# Patient Record
Sex: FEMALE | Race: WHITE | Employment: UNEMPLOYED | URBAN - METROPOLITAN AREA
[De-identification: names, ages, dates, MRNs, and addresses within clinical notes are randomized per-mention and may not be internally consistent; named-entity substitution may affect disease eponyms.]

---

## 2020-02-02 ENCOUNTER — HOSPITAL ENCOUNTER (INPATIENT)
Age: 25
LOS: 8 days | Discharge: HOME | DRG: 753 | End: 2020-02-10
Attending: PSYCHIATRY & NEUROLOGY | Admitting: PSYCHIATRY & NEUROLOGY
Payer: MEDICAID

## 2020-02-02 PROBLEM — F31.9 BIPOLAR 1 DISORDER (HCC): Status: ACTIVE | Noted: 2020-02-02

## 2020-02-02 PROCEDURE — 1240000000 HC EMOTIONAL WELLNESS R&B

## 2020-02-02 RX ORDER — NICOTINE 21 MG/24HR
1 PATCH, TRANSDERMAL 24 HOURS TRANSDERMAL DAILY
Status: DISCONTINUED | OUTPATIENT
Start: 2020-02-03 | End: 2020-02-05

## 2020-02-02 RX ORDER — TRAZODONE HYDROCHLORIDE 50 MG/1
50 TABLET ORAL NIGHTLY PRN
Status: DISCONTINUED | OUTPATIENT
Start: 2020-02-02 | End: 2020-02-10 | Stop reason: HOSPADM

## 2020-02-02 ASSESSMENT — SLEEP AND FATIGUE QUESTIONNAIRES
AVERAGE NUMBER OF SLEEP HOURS: 4
DIFFICULTY FALLING ASLEEP: YES
DIFFICULTY STAYING ASLEEP: YES
SLEEP PATTERN: DIFFICULTY FALLING ASLEEP;RESTLESSNESS;DISTURBED/INTERRUPTED SLEEP
DO YOU HAVE DIFFICULTY SLEEPING: YES
DIFFICULTY ARISING: NO
RESTFUL SLEEP: NO
DO YOU USE A SLEEP AID: NO

## 2020-02-02 ASSESSMENT — LIFESTYLE VARIABLES: HISTORY_ALCOHOL_USE: NO

## 2020-02-02 ASSESSMENT — PAIN - FUNCTIONAL ASSESSMENT: PAIN_FUNCTIONAL_ASSESSMENT: 0-10

## 2020-02-03 LAB
ALBUMIN SERPL-MCNC: 4.7 G/DL (ref 3.5–5.2)
ALBUMIN/GLOBULIN RATIO: NORMAL (ref 1–2.5)
ALP BLD-CCNC: 66 U/L (ref 35–104)
ALT SERPL-CCNC: 14 U/L (ref 5–33)
ANION GAP SERPL CALCULATED.3IONS-SCNC: 10 MMOL/L (ref 9–17)
AST SERPL-CCNC: 20 U/L
BILIRUB SERPL-MCNC: 0.42 MG/DL (ref 0.3–1.2)
BUN BLDV-MCNC: 15 MG/DL (ref 6–20)
BUN/CREAT BLD: NORMAL (ref 9–20)
CALCIUM SERPL-MCNC: 9.7 MG/DL (ref 8.6–10.4)
CHLORIDE BLD-SCNC: 99 MMOL/L (ref 98–107)
CO2: 28 MMOL/L (ref 20–31)
CREAT SERPL-MCNC: 0.84 MG/DL (ref 0.5–0.9)
GFR AFRICAN AMERICAN: >60 ML/MIN
GFR NON-AFRICAN AMERICAN: >60 ML/MIN
GFR SERPL CREATININE-BSD FRML MDRD: NORMAL ML/MIN/{1.73_M2}
GFR SERPL CREATININE-BSD FRML MDRD: NORMAL ML/MIN/{1.73_M2}
GLUCOSE BLD-MCNC: 91 MG/DL (ref 70–99)
POTASSIUM SERPL-SCNC: 4.8 MMOL/L (ref 3.7–5.3)
SODIUM BLD-SCNC: 137 MMOL/L (ref 135–144)
TOTAL PROTEIN: 7.2 G/DL (ref 6.4–8.3)
TSH SERPL DL<=0.05 MIU/L-ACNC: 0.96 MIU/L (ref 0.3–5)

## 2020-02-03 PROCEDURE — 1240000000 HC EMOTIONAL WELLNESS R&B

## 2020-02-03 PROCEDURE — 6370000000 HC RX 637 (ALT 250 FOR IP): Performed by: PSYCHIATRY & NEUROLOGY

## 2020-02-03 PROCEDURE — 36415 COLL VENOUS BLD VENIPUNCTURE: CPT

## 2020-02-03 PROCEDURE — 80053 COMPREHEN METABOLIC PANEL: CPT

## 2020-02-03 PROCEDURE — 99223 1ST HOSP IP/OBS HIGH 75: CPT | Performed by: PSYCHIATRY & NEUROLOGY

## 2020-02-03 PROCEDURE — 84443 ASSAY THYROID STIM HORMONE: CPT

## 2020-02-03 RX ORDER — ACETAMINOPHEN 325 MG/1
650 TABLET ORAL EVERY 4 HOURS PRN
Status: DISCONTINUED | OUTPATIENT
Start: 2020-02-03 | End: 2020-02-10 | Stop reason: HOSPADM

## 2020-02-03 RX ORDER — LORAZEPAM 2 MG/ML
1 INJECTION INTRAMUSCULAR EVERY 4 HOURS PRN
Status: DISCONTINUED | OUTPATIENT
Start: 2020-02-03 | End: 2020-02-10 | Stop reason: HOSPADM

## 2020-02-03 RX ORDER — TRAZODONE HYDROCHLORIDE 50 MG/1
50 TABLET ORAL NIGHTLY PRN
Status: DISCONTINUED | OUTPATIENT
Start: 2020-02-03 | End: 2020-02-03

## 2020-02-03 RX ORDER — HALOPERIDOL 10 MG/1
5 TABLET ORAL EVERY 4 HOURS PRN
Status: DISCONTINUED | OUTPATIENT
Start: 2020-02-03 | End: 2020-02-10 | Stop reason: HOSPADM

## 2020-02-03 RX ORDER — HALOPERIDOL 5 MG/ML
5 INJECTION INTRAMUSCULAR EVERY 4 HOURS PRN
Status: DISCONTINUED | OUTPATIENT
Start: 2020-02-03 | End: 2020-02-10 | Stop reason: HOSPADM

## 2020-02-03 RX ORDER — LORAZEPAM 1 MG/1
1 TABLET ORAL EVERY 4 HOURS PRN
Status: DISCONTINUED | OUTPATIENT
Start: 2020-02-03 | End: 2020-02-10 | Stop reason: HOSPADM

## 2020-02-03 RX ADMIN — LURASIDONE HYDROCHLORIDE 40 MG: 40 TABLET, FILM COATED ORAL at 21:02

## 2020-02-03 ASSESSMENT — SLEEP AND FATIGUE QUESTIONNAIRES
DIFFICULTY STAYING ASLEEP: YES
DIFFICULTY ARISING: NO
DO YOU HAVE DIFFICULTY SLEEPING: YES
DIFFICULTY FALLING ASLEEP: YES
SLEEP PATTERN: DIFFICULTY FALLING ASLEEP;RESTLESSNESS;DISTURBED/INTERRUPTED SLEEP
AVERAGE NUMBER OF SLEEP HOURS: 4
DO YOU USE A SLEEP AID: NO
RESTFUL SLEEP: NO

## 2020-02-03 ASSESSMENT — LIFESTYLE VARIABLES: HISTORY_ALCOHOL_USE: NO
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
## 2020-02-03 NOTE — H&P
Department of Psychiatry  History and Physical - Adult     CHIEF COMPLAINT:  Bipolar disorder    History obtained from:  patient, electronic medical record    Patient was seen after discussing with the treatment team and reviewing the chart\    CIRCUMSTANCES OF ADMISSION: Per admission note  Patient reports recent break up with her oldest daughter's father. After break up, she reports losing her full time job. Unable to pay her rent for 2 months and lost apartment. Lost insurance and couldn't afford meds. Off since Oct 2019. Moved back in with mother in [facility]. Recently started new job in [place] area and has been commuting there, sleeping in her car and showering at her fitness facility. Denies AOD abuse. Homeless and has been \"stalking\" a debbie she doesn't know and his family; recent protection order against her. Posted on [employer] \" I just want to leave it all. ..forever\" Manic, hyper-verbal, tangential speech but cooperative    HISTORY OF PRESENT ILLNESS:      The patient is a 30 y.o. female with significant past history of bipolar disorder who was admitted with suicidal ideation    Patient reports that she was admitted to the hospital because she just mentioned suicidal ideation and that was picked up by her father who lives in [place] which led to her admission. She is not happy about being admitted to the hospital.  However she does recognize that she is not feeling normal and this is related to her stopping her medications which is Latuda 40 mg nightly. In terms of stressors she describes that she broke up with her boyfriend recently and also lost her job as a . She has 2 children aged 3 and 2. The older child is with her mother and the younger child is with her biological father. The patient gets no child support. The patient also has to child protective orders in place against her.   She is currently homeless though she wants to get a place in a low income housing project at input(s): BILITOT, ALKPHOS, AST, ALT in the last 72 hours. No results found for: Harles Dimas, LABBENZ, CANNAB, COCAINESCRN, LABMETH, [address], PHENCYCLIDINESCREENURINE, PPXUR, ETOH  No results found for: TSH, FREET4  No results found for: LITHIUM  No results found for: VALPROATE, CBMZ  No results found for: LITHIUM, VALPROATE    FURTHER LABS ORDERED :      Radiology   No results found. EKG: n/a    TREATMENT PLAN:    Risk Management:  routine:  no special precautions necessary    Collateral Information:  Will obtain collateral information from the family or friends. Will obtain medical records as appropriate from out patient providers  Will consult the hospitalist for a physical exam to rule out any co-morbid physical condition. Home medication Reconciled       New Medications started during this admission :    Add Latuda 40 mg nightly  Prn Haldol 5mg and Vistaril 50mg q6hr for extreme agitation. Trazodone as ordered for insomnia  Vistaril as ordered for anxiety  Discussed with the patient risk, benefit, alternative and common side effects for the  proposed medication treatment. Patient is consenting to the treatment. Psychotherapy:   Encourage participation in milieu and group therapy  Individual therapy as needed        Behavioral Services  Medicare Certification      Admission Day 1  I certify that this patient's inpatient psychiatric hospital admission is medically necessary for:     (1) treatment which could reasonably be expected to improve this patient's condition, or     (2) diagnostic study or its equivalent. Electronically signed by Ulisses Maldonado MD on 2/3/2020 at 1:58 PM  Please note that this chart was generated using voice recognition Dragon dictation software. Although every effort was made to ensure the accuracy of this automated transcription, some errors in transcription may have occurred.

## 2020-02-03 NOTE — FLOWSHEET NOTE
*Patient participated in the Spirituality Group       02/03/20 4925   Encounter Summary   Services provided to: Patient   Referral/Consult From: Rounding   Continue Visiting   (2/3/20)   Complexity of Encounter Low   Length of Encounter 30 minutes   Spiritual/Tenriism   Type Spiritual support   Assessment Calm; Approachable   Intervention Active listening   Outcome Receptive
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
## 2020-02-03 NOTE — H&P
of the speech. PROVISIONAL DIAGNOSES:      Active Problems:    Bipolar 1 disorder (HCC)  Resolved Problems:    * No resolved hospital problems.  *      ZACK QUINTANILLA - CNP on 2/3/2020 at 2:31 PM

## 2020-02-04 LAB
HCT VFR BLD CALC: 38.4 % (ref 36–46)
HEMOGLOBIN: 13.3 G/DL (ref 12–16)
MCH RBC QN AUTO: 34.9 PG (ref 26–34)
MCHC RBC AUTO-ENTMCNC: 34.5 G/DL (ref 31–37)
MCV RBC AUTO: 101.2 FL (ref 80–100)
NRBC AUTOMATED: ABNORMAL PER 100 WBC
PDW BLD-RTO: 13.7 % (ref 11.5–14.9)
PLATELET # BLD: 247 K/UL (ref 150–450)
PMV BLD AUTO: 7.7 FL (ref 6–12)
RBC # BLD: 3.8 M/UL (ref 4–5.2)
WBC # BLD: 6 K/UL (ref 3.5–11)

## 2020-02-04 PROCEDURE — 36415 COLL VENOUS BLD VENIPUNCTURE: CPT

## 2020-02-04 PROCEDURE — 90833 PSYTX W PT W E/M 30 MIN: CPT | Performed by: PSYCHIATRY & NEUROLOGY

## 2020-02-04 PROCEDURE — 6370000000 HC RX 637 (ALT 250 FOR IP): Performed by: PSYCHIATRY & NEUROLOGY

## 2020-02-04 PROCEDURE — 99232 SBSQ HOSP IP/OBS MODERATE 35: CPT | Performed by: PSYCHIATRY & NEUROLOGY

## 2020-02-04 PROCEDURE — 85027 COMPLETE CBC AUTOMATED: CPT

## 2020-02-04 PROCEDURE — 1240000000 HC EMOTIONAL WELLNESS R&B

## 2020-02-04 RX ADMIN — LURASIDONE HYDROCHLORIDE 40 MG: 40 TABLET, FILM COATED ORAL at 20:58

## 2020-02-04 NOTE — GROUP NOTE
Group Therapy Note    Date: 2/4/2020    Group Start Time: 1330  Group End Time: 7811  Group Topic: Recovery    [facility] D    LACEY Smith, JANEY        Group Therapy Note    Attendees: 9/21       Patient's Goal:  Increase understanding of addiction and recovery process. Notes:  Pt is making progress AEB participating in group discussion about the importance of relationships and support during early recovery. Status After Intervention:  Improved    Participation Level:  Active Listener and Interactive    Participation Quality: Appropriate, Attentive and Supportive      Speech:  normal      Thought Process/Content: Logical      Affective Functioning: Congruent      Mood: euthymic      Level of consciousness:  Alert, Oriented x4 and Attentive      Response to Learning: Progressing to goal      Endings: None Reported    Modes of Intervention: Education, Support, Socialization, Exploration, Clarifying and Problem-solving      Discipline Responsible: /Counselor      Signature:  LACEY Smith LSW, [place]

## 2020-02-04 NOTE — GROUP NOTE
Group Therapy Note    Date: 2/4/2020    Group Start Time: 1100  Group End Time: 1130  Group Topic: Psychotherapy    [facility] D    Camryn Lugo        Group Therapy Note    Attendees: 11/12         Patient's Goal: PT will demonstrate increased interpersonal interaction and a clear understanding on multiple types of coping skills relating to the here-and-now therapeutic practice. Notes:  Patient is making progress, AEB participating in group discussion, actively listening, and supporting other group members. PT participates in group and encourages others to participate     Status After Intervention:  Improved    Participation Level: Active Listener and Interactive    Participation Quality: Appropriate, Attentive, Sharing and Supportive      Speech:  normal      Thought Process/Content: Logical  Flight of ideas      Affective Functioning: Congruent      Mood: irritable      Level of consciousness:  Alert, Oriented x4 and Attentive      Response to Learning: Able to verbalize current knowledge/experience, Able to verbalize/acknowledge new learning and Progressing to goal      Endings: None Reported    Modes of Intervention: Support, Socialization, Exploration, Clarifying and Problem-solving      Discipline Responsible: /Counselor      Signature:   Camryn Lugo

## 2020-02-04 NOTE — GROUP NOTE
Group Therapy Note    Date: 2/4/2020    Group Start Time: 1600  Group End Time: 9758  Group Topic: Healthy Living/Wellness    NOLA Rogel        Group Therapy Note    Attendees:11/20    patient refused to attend guided relaxation group at 1600 after encouragement from staff. 1:1 talk time provided as alternative to group session.     Signature:  Jorge Rogel

## 2020-02-04 NOTE — GROUP NOTE
Group Therapy Note    Date: 2/4/2020    Group Start Time: 8985  Group End Time: 3390  Group Topic: Cognitive Skills    [facility] D    Michelle Knight CTRS        Group Therapy Note    Attendees: 8/19                   Patient's Goal:  To increase social interaction and to practice problem solving, and communication skills . Notes: Pt participated fully in group task . Pt was able to practice problem solving, and communication skills . Pt is pleasant and supportive of peers     Status After Intervention:  Improved     Participation Level:  Active Listener and Interactive     Participation Quality: Appropriate, Attentive, Sharing and Supportive        Speech:  hyperverbal at times ,needs prompts to allow others to take turns      Thought Process/Content: Logical, Linear           Affective Functioning: Congruent        Mood: euthymic        Level of consciousness:  Alert, Oriented x4 and Attentive        Response to Learning: Able to verbalize current knowledge/experience, Able to verbalize/acknowledge new learning, Able to retain information and Progressing to goal        Endings: None Reported     Modes of Intervention: Education, Support, Socialization, Exploration, Clarifying and Problem-solving        Discipline Responsible: Psychoeducational Specialist        Signature:  NAI CarlosS

## 2020-02-04 NOTE — PROGRESS NOTES
BEHAVIORAL HEALTH FOLLOW-UP NOTE     2/4/2020     Patient was seen and examined in person, Chart reviewed   Patient's case discussed with staff/team    Chief Complaint: Bipolar disorder, manic    Interim History:     The patient reports that she has begun to feel a little calmer since starting her medication. She continues to be somewhat hyperactive and her mood is euphoric. She has some improvement in her irritability. Her thought process also appears more coherent. We had a lengthy discussion about the timeline of her symptoms and her diagnosis. Lilliam Fitch Appetite:   [x] Normal/Unchanged  [] Increased  [] Decreased      Sleep:       [x] Normal/Unchanged  [] Fair       [] Poor              Energy:    [] Normal/Unchanged  [] Increased  [] Decreased        SI [] Present  [x] Absent    HI  []Present  [x] Absent     Aggression:  [] yes  [x] no    Patient is [x] able  [] unable to CONTRACT FOR SAFETY ON THE UNIT    PAST MEDICAL/PSYCHIATRIC HISTORY:   Past Medical History:   Diagnosis Date    Psychiatric problem        FAMILY/SOCIAL HISTORY:  History reviewed. No pertinent family history.   Social History     Socioeconomic History    Marital status: Single     Spouse name: Not on file    Number of children: Not on file    Years of education: Not on file    Highest education level: Not on file   Occupational History    Not on file   Social Needs    Financial resource strain: Not on file    Food insecurity:     Worry: Not on file     Inability: Not on file    Transportation needs:     Medical: Not on file     Non-medical: Not on file   Tobacco Use    Smoking status: Light Tobacco Smoker     Types: Cigarettes     Start date: 1/5/2020    Smokeless tobacco: Never Used   Substance and Sexual Activity    Alcohol use: Not Currently    Drug use: Not Currently    Sexual activity: Not Currently   Lifestyle    Physical activity:     Days per week: Not on file     Minutes per session: Not on file    Stress: Not on file kg)   LMP  (LMP Unknown)   BMI 21.14 kg/m²   Gait - steady  Medication side effects(SE): NONE    Mental Status Examination:    Level of consciousness:  within normal limits   Appearance:  fair grooming and fair hygiene  Behavior/Motor:  no abnormalities noted  Attitude toward examiner:  cooperative  Speech:  loud and hyperverbal   Mood: euphoric  Affect:  mood congruent and intense  Thought processes:  overabundance of ideas   Thought content:  Homocidal ideation none  Suicidal Ideation:  denies suicidal ideation  Delusions:  no evidence of delusions  Perceptual Disturbance:  denies any perceptual disturbance  Cognition:  oriented to person, place, and time   Concentration distractible  Insight poor   Judgement poor     ASSESSMENT:   Patient symptoms are:  [] Well controlled  [x] Improving  [] Worsening  [] No change      Diagnosis: [place]  Active Problems:    [facility]  Resolved Problems:    * No resolved hospital problems. *      LABS:    Recent Labs     02/04/20  0700   WBC 6.0   HGB 13.3        Recent Labs     02/03/20  1710      K 4.8   CL 99   CO2 28   BUN 15   CREATININE 0.84   GLUCOSE 91     Recent Labs     02/03/20  1710   BILITOT 0.42   ALKPHOS 66   AST 20   ALT 14     No results found for: Felicia Lust, BARBSCNU, LABBENZ, CANNAB, COCAINESCRN, LABMETH, OPIATESCREENURINE, PHENCYCLIDINESCREENURINE, PPXUR, ETOH  Lab Results   Component Value Date    TSH 0.96 02/03/2020     No results found for: LITHIUM  No results found for: VALPROATE, CBMZ    RISK ASSESSMENT: low risk of suicide or harm to others      Treatment Plan:  Reviewed current Medications with the patient. Continue Latuda 40 mg daily    Risks, benefits, side effects, drug-to-drug interactions and alternatives to treatment were discussed. The patient  consented to treatment. Encourage patient to attend group and other milieu activities.   Discharge planning discussed with the patient and treatment

## 2020-02-04 NOTE — GROUP NOTE
Group Therapy Note    Date: 2/4/2020    Group Start Time: 1000  Group End Time: 6874  Group Topic: Cognitive Skills    [facility] NILSON Harmon, CTRS        Group Therapy Note    Attendees: 7/10     Adjusted number: 8/12 due to new admissions      Patient's Goal:  To increase social interaction and to practice listening, concentration, and communication skills . Notes: Pt participated fully in group task . Pt was able to practice listening, concentration, and communication skills . Pt is pleasant and supportive of peers but needs prompts at times to allow peers to do things for themselves. less intrusive than yesterday. Status After Intervention:  Improved     Participation Level:  Active Listener and Interactive     Participation Quality: Appropriate, Attentive, Sharing and Supportive        Speech:  normal        Thought Process/Content: Logical, Linear           Affective Functioning: Congruent        Mood: euthymic        Level of consciousness:  Alert, Oriented x4 and Attentive        Response to Learning: Able to verbalize current knowledge/experience, Able to verbalize/acknowledge new learning, Able to retain information and Progressing to goal        Endings: None Reported     Modes of Intervention: Education, Support, Socialization, Exploration, Clarifying and Problem-solving        Discipline Responsible: Psychoeducational Specialist        Signature:  Brandan Skinner

## 2020-02-05 LAB
AMPHETAMINE SCREEN URINE: NEGATIVE
BARBITURATE SCREEN URINE: NEGATIVE
BENZODIAZEPINE SCREEN, URINE: NEGATIVE
BUPRENORPHINE URINE: NORMAL
CANNABINOID SCREEN URINE: NEGATIVE
COCAINE METABOLITE, URINE: NEGATIVE
HCG(URINE) PREGNANCY TEST: NEGATIVE
MDMA URINE: NORMAL
METHADONE SCREEN, URINE: NEGATIVE
METHAMPHETAMINE, URINE: NORMAL
OPIATES, URINE: NEGATIVE
OXYCODONE SCREEN URINE: NEGATIVE
PHENCYCLIDINE, URINE: NEGATIVE
PROPOXYPHENE, URINE: NORMAL
TEST INFORMATION: NORMAL
TRICYCLIC ANTIDEPRESSANTS, UR: NORMAL

## 2020-02-05 PROCEDURE — 90833 PSYTX W PT W E/M 30 MIN: CPT | Performed by: PSYCHIATRY & NEUROLOGY

## 2020-02-05 PROCEDURE — 99232 SBSQ HOSP IP/OBS MODERATE 35: CPT | Performed by: PSYCHIATRY & NEUROLOGY

## 2020-02-05 PROCEDURE — 1240000000 HC EMOTIONAL WELLNESS R&B

## 2020-02-05 PROCEDURE — 80307 DRUG TEST PRSMV CHEM ANLYZR: CPT

## 2020-02-05 PROCEDURE — 6370000000 HC RX 637 (ALT 250 FOR IP): Performed by: PSYCHIATRY & NEUROLOGY

## 2020-02-05 PROCEDURE — 81025 URINE PREGNANCY TEST: CPT

## 2020-02-05 RX ADMIN — LURASIDONE HYDROCHLORIDE 40 MG: 40 TABLET, FILM COATED ORAL at 21:17

## 2020-02-05 NOTE — FLOWSHEET NOTE
*Patient participated in the [employer] Service       02/05/20 [address] provided to: Patient   Referral/Consult From: Rounding   Continue Visiting   (2/5/20)   Complexity of Encounter Moderate   Length of Encounter 30 minutes   Spiritual Assessment Completed Yes   Spiritual/Evangelical   Type Spiritual support   Assessment Calm   Intervention Active listening   Outcome Receptive

## 2020-02-05 NOTE — PROGRESS NOTES
Pharmacy Med Education Group Note    Date: 2/5/20  Start Time: 1430  End Time: 6659    Number Participants in Group:  9    Goal:  Patient will demonstrate an understanding of the medications intended purpose and possible adverse effects  Topic: [place] for Pharmacy Med Ed Group    Discipline Responsible:     OT  AT  [facility].  RT     X Other       Participation Level:     None  Minimal      X Active Listener    X Interactive    Monopolizing         Participation Quality:    X Appropriate  Inappropriate     X       Attentive        Intrusive          Sharing        Resistant          Supportive        Lethargic       Affective:     X Congruent  Incongruent  Blunted  Flat    Constricted  Anxious  Elated  Angry    Labile  Depressed  Other         Cognitive:    X Alert  Oriented PPTP     Concentration   X G  F  P   Attention Span   X G  F  P   Short-Term Memory   X G  F  P   Long-Term Memory  G  F  P   ProblemSolving/  Decision Making  G  F  P   Ability to Process  Information   X G  F  P      Contributing Factors             Delusional             Hallucinating             Flight of Ideas             Other:       Modes of Intervention:    X Education   X Support  Exploration    Clarifying  Problem Solving  Confrontation    Socialization  Limit Setting  Reality Testing    Activity  Movement  Media    Other:            Response to Learning:    X Able to verbalize current knowledge/experience    Able to verbalize/acknowledge new learning    Able to retain information    Capable of insight    Able to change behavior    Progressing to goal    Other:        Comments:     Xochitl Bates,PharmD, 2/5/2020, 4:49 PM

## 2020-02-05 NOTE — GROUP NOTE
Group Therapy Note    Date: 2/5/2020    Group Start Time: 1000  Group End Time: 6875  Group Topic: Cognitive Skills    [facility]    Lew Saleh, CTRS        Group Therapy Note    Attendees: 8/10         Patient's Goal:  To increase social interaction and to practice decision making, and communication skills . Notes: Pt participated fully in group task . Pt was able to practice decision making, and communication skills . Pt is pleasant and supportive of peers     Status After Intervention:  Improved     Participation Level:  Active Listener and Interactive     Participation Quality: Appropriate, Attentive, Sharing and Supportive        Speech:  normal        Thought Process/Content: Logical, Linear           Affective Functioning: Congruent        Mood: euthymic        Level of consciousness:  Alert, Oriented x4 and Attentive        Response to Learning: Able to verbalize current knowledge/experience, Able to verbalize/acknowledge new learning, Able to retain information and Progressing to goal        Endings: None Reported     Modes of Intervention: Education, Support, Socialization, Exploration, Clarifying and Problem-solving        Discipline Responsible: Psychoeducational Specialist        Signature:  Torri Hodges

## 2020-02-05 NOTE — PROGRESS NOTES
BEHAVIORAL HEALTH FOLLOW-UP NOTE     2/5/2020     Patient was seen and examined in person, Chart reviewed   Patient's case discussed with staff/team    Chief Complaint: Bipolar disorder, manic    Interim History:     Patient reports that she feels better. Her speech continues to be very loud but is reducing pressure. The patient was pleasant and had no signs of irritability today. We had a lengthy discussion about the patient's history of symptoms and the need to take medications. At this time the patient is not reporting any side effects from Annella Susan. We discussed that Annella Susan is relatively well-tolerated and is a lower risk medication. I have encouraged the patient to take this medication indefinitely and to speak to a professional if she wants to discontinue it. Appetite:   [x] Normal/Unchanged  [] Increased  [] Decreased      Sleep:       [x] Normal/Unchanged  [] Fair       [] Poor              Energy:    [] Normal/Unchanged  [] Increased  [] Decreased        SI [] Present  [x] Absent    HI  []Present  [x] Absent     Aggression:  [] yes  [x] no    Patient is [x] able  [] unable to CONTRACT FOR SAFETY ON THE UNIT    PAST MEDICAL/PSYCHIATRIC HISTORY:   Past Medical History:   Diagnosis Date    Psychiatric problem        FAMILY/SOCIAL HISTORY:  History reviewed. No pertinent family history.   Social History     Socioeconomic History    Marital status: Single     Spouse name: Not on file    Number of children: Not on file    Years of education: Not on file    Highest education level: Not on file   Occupational History    Not on file   Social Needs    Financial resource strain: Not on file    Food insecurity:     Worry: Not on file     Inability: Not on file    Transportation needs:     Medical: Not on file     Non-medical: Not on file   Tobacco Use    Smoking status: Light Tobacco Smoker     Types: Cigarettes     Start date: 1/5/2020    Smokeless tobacco: Never Used   Substance and Sexual Activity  Alcohol use: Not Currently    Drug use: Not Currently    Sexual activity: Not Currently   Lifestyle    Physical activity:     Days per week: Not on file     Minutes per session: Not on file    Stress: Not on file   Relationships    Social connections:     Talks on phone: Not on file     Gets together: Not on file     Attends Baptism service: Not on file     Active member of club or organization: Not on file     Attends meetings of clubs or organizations: Not on file     Relationship status: Not on file    Intimate partner violence:     Fear of current or ex partner: Not on file     Emotionally abused: Not on file     Physically abused: Not on file     Forced sexual activity: Not on file   Other Topics Concern    Not on file   Social History Narrative    Not on file           ROS:  [x] All negative/unchanged except if checked.  Explain positive(checked items) below:  [] Constitutional  [] Eyes  [] Ear/Nose/Mouth/Throat  [] Respiratory  [] CV  [] GI  []   [] Musculoskeletal  [] Skin/Breast  [] Neurological  [] Endocrine  [] Heme/Lymph  [] Allergic/Immunologic    Explanation:     MEDICATIONS:    Current Facility-Administered Medications:     lurasidone (LATUDA) tablet 40 mg, 40 mg, Oral, Nightly, Jamal Faith MD, 40 mg at 02/04/20 2058    acetaminophen (TYLENOL) tablet 650 mg, 650 mg, Oral, Q4H PRN, Jamal Faith MD    magnesium hydroxide (MILK OF MAGNESIA) 400 MG/5ML suspension 30 mL, 30 mL, Oral, Daily PRN, Jamal Faith MD    LORazepam (ATIVAN) tablet 1 mg, 1 mg, Oral, Q4H PRN **OR** LORazepam (ATIVAN) injection 1 mg, 1 mg, Intramuscular, Q4H PRN, Jamal Faith MD    haloperidol lactate (HALDOL) injection 5 mg, 5 mg, Intramuscular, Q4H PRN **OR** haloperidol (HALDOL) tablet 5 mg, 5 mg, Oral, Q4H PRN, Jamal Faith MD    traZODone (DESYREL) tablet 50 mg, 50 mg, Oral, Nightly PRN, Boo Moreno MD      Examination:  /60   Pulse 94   Temp 99.1 °F (37.3 °C) (Oral)   Resp 14   Ht

## 2020-02-05 NOTE — GROUP NOTE
Group Therapy Note    Date: 2/5/2020    Group Start Time: 1600  Group End Time: 5656  Group Topic: Healthy Living/Wellness    [facility]    Wolf Evans, RN        Group Therapy Note    patient refused to attend wellness group at 1600 after encouragement from staff.  1:1 talk time offered but refused          Signature:  Wolf Evans, RN

## 2020-02-06 PROCEDURE — 6370000000 HC RX 637 (ALT 250 FOR IP): Performed by: PSYCHIATRY & NEUROLOGY

## 2020-02-06 PROCEDURE — 1240000000 HC EMOTIONAL WELLNESS R&B

## 2020-02-06 PROCEDURE — 90833 PSYTX W PT W E/M 30 MIN: CPT | Performed by: PSYCHIATRY & NEUROLOGY

## 2020-02-06 PROCEDURE — 99232 SBSQ HOSP IP/OBS MODERATE 35: CPT | Performed by: PSYCHIATRY & NEUROLOGY

## 2020-02-06 RX ADMIN — LURASIDONE HYDROCHLORIDE 60 MG: 60 TABLET, FILM COATED ORAL at 18:37

## 2020-02-06 NOTE — GROUP NOTE
Group Therapy Note    Date: 2/6/2020    Group Start Time: 1330  Group End Time: 0968  Group Topic: Recovery    [facility]    LACEY Hurst, JANEY        Group Therapy Note    Attendees: 9/21     Patient's Goal:  Increase understanding of addiction and recovery process. Notes:  Pt is making progress AEB participating in group discussion about coping with grief and loss in early recovery. Status After Intervention:  Improved    Participation Level:  Active Listener and Interactive    Participation Quality: Appropriate, Attentive and Supportive      Speech:  normal      Thought Process/Content: Logical      Affective Functioning: Congruent      Mood: euthymic      Level of consciousness:  Alert, Oriented x4 and Attentive      Response to Learning: Progressing to goal      Endings: None Reported    Modes of Intervention: Education, Support, Socialization, Exploration, Clarifying and Problem-solving      Discipline Responsible: /Counselor      Signature:  LACEY Hurst LSW, [place]

## 2020-02-06 NOTE — GROUP NOTE
Group Therapy Note    Date: 2/6/2020    Group Start Time: 1000  Group End Time: 9844  Group Topic: Cognitive Skills    [facility] VALARIE Villareal        Group Therapy Note    Attendees: 5/9         Patient's Goal:  To increase social interaction and to practice concentration skills using a multi focus task     Notes: Pt participated fully in group task . Pt was able to practice concentration skills using a multi focus task but did become tired working on this over time. Pt is pleasant and supportive of peers     Status After Intervention:  Improved with prompts needed toward end of group     Participation Level: Active Listener and Interactive     Participation Quality: Appropriate, Attentive, Sharing and Supportive        Speech:  normal        Thought Process/Content: Logical, pt identifies that keeping track of multi foci is difficult over time and takes effort.  Pt is accepting of prompts from peers as needed         Affective Functioning: Congruent        Mood: euthymic        Level of consciousness:  Alert, Oriented x4 and Attentive        Response to Learning: Able to verbalize current knowledge/experience, Able to verbalize/acknowledge new learning, Able to retain information and Progressing to goal        Endings: None Reported     Modes of Intervention: Education, Support, Socialization, Exploration, Clarifying and Problem-solving        Discipline Responsible: Psychoeducational Specialist        Signature:  VALARIE Vega
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
## 2020-02-06 NOTE — GROUP NOTE
Group Therapy Note    Date: 2/6/2020    Group Start Time: 1100  Group End Time: 1130  Group Topic: Psychotherapy    [facility]    Vipin Campoverde        Group Therapy Note    Attendees:6/10         Patient's Goal:  PT will demonstrate increased interpersonal interaction and a clear understanding on multiple types of coping skills relating to the here-and-now therapeutic practice    Notes:  :  Patient is making progress, AEB participating in group discussion, actively listening, and supporting other group members. PT had some difficulty during group discussion and was expressing anger and raising her voice because she was told by the doctor that she wasn't going to be discharged today. Status After Intervention:  Unchanged    Participation Level: Active Listener and Interactive    Participation Quality: Appropriate, Attentive and Inappropriate      Speech:  pressured      Thought Process/Content: Flight of ideas      Affective Functioning: Flat      Mood: angry      Level of consciousness:  Alert, Oriented x4 and Attentive      Response to Learning: Able to verbalize current knowledge/experience and Progressing to goal      Endings: None Reported    Modes of Intervention: Support, Socialization, Exploration, Clarifying and Problem-solving      Discipline Responsible: /Counselor      Signature:   Vipin Campoverde

## 2020-02-06 NOTE — GROUP NOTE
Group Therapy Note    Date: 2/5/2020    Group Start Time: 2010  Group End Time: 2035  Group Topic: Wrap-Up    [facility]    Bassem Gan        Group Therapy Note    Attendees: 13/19         Patient's Goal:  Wrap Up group    Notes:  Short and long term goals    Status After Intervention:  Improved    Participation Level:  Active Listener and Interactive    Participation Quality: Appropriate, Attentive and Sharing      Speech:  normal      Thought Process/Content: Logical      Affective Functioning: Congruent      Mood: within normal limits      Level of consciousness:  Alert, Oriented x4 and Attentive      Response to Learning: Able to verbalize current knowledge/experience, Able to verbalize/acknowledge new learning, Able to retain information and Progressing to goal      Endings: None Reported    Modes of Intervention: Education, Support and Socialization      Discipline Responsible: [address], [facility] [employer]      Signature:  Bassem Gan

## 2020-02-06 NOTE — GROUP NOTE
Group Therapy Note    Date: 2/5/2020    Group Start Time: 2100  Group End Time: 2130  Group Topic: Relaxation    [facility] NILSON Lepe        Group Therapy Note    Attendees: 13/19         Patient's Goal: Relaxation    Notes:  Relaxation    Status After Intervention:  Improved    Participation Level: Interactive    Participation Quality: Appropriate and Supportive      Speech:  normal      Thought Process/Content: Logical      Affective Functioning: Congruent      Mood: within normal limits      Level of consciousness:  Alert and Oriented x4      Response to Learning: Progressing to goal      Endings: None Reported    Modes of Intervention: Support and Socialization      Discipline Responsible: [address], [employer]      Signature:  Leonidas Lepe

## 2020-02-07 PROCEDURE — 99232 SBSQ HOSP IP/OBS MODERATE 35: CPT | Performed by: PSYCHIATRY & NEUROLOGY

## 2020-02-07 PROCEDURE — 90833 PSYTX W PT W E/M 30 MIN: CPT | Performed by: PSYCHIATRY & NEUROLOGY

## 2020-02-07 PROCEDURE — 6370000000 HC RX 637 (ALT 250 FOR IP): Performed by: PSYCHIATRY & NEUROLOGY

## 2020-02-07 PROCEDURE — 1240000000 HC EMOTIONAL WELLNESS R&B

## 2020-02-07 RX ADMIN — LURASIDONE HYDROCHLORIDE 60 MG: 60 TABLET, FILM COATED ORAL at 17:54

## 2020-02-07 NOTE — GROUP NOTE
Group Therapy Note    Date: 2/7/2020    Group Start Time: 1100  Group End Time: 1130  Group Topic: Psychotherapy    [facility] NILSON Weber        Group Therapy Note    Attendees:4/9         Patient's Goal:   PT will demonstrate increased interpersonal interaction and a clear understanding on multiple types of coping skills relating to the here-and-now therapeutic practice. Notes:  :  Patient is making progress, AEB participating in group discussion, actively listening, and supporting other group members. PT participates in group and encourages others to participate     Status After Intervention:  Improved    Participation Level: Active Listener and Interactive    Participation Quality: Appropriate, Attentive and Sharing      Speech:  normal      Thought Process/Content: Logical      Affective Functioning: Congruent      Mood: stable      Level of consciousness:  Alert, Oriented x4 and Attentive      Response to Learning: Able to verbalize current knowledge/experience      Endings: None Reported    Modes of Intervention: Support, Socialization, Exploration, Clarifying and Problem-solving      Discipline Responsible: /Counselor      Signature:   Jhonny Weber

## 2020-02-07 NOTE — GROUP NOTE
Group Therapy Note    Date: 2/7/2020    Group Start Time: 0900  Group End Time: 0930  Group Topic: Community Meeting    [address] [place] [address] NILSON    Yinka [facility], [place]        Group Therapy Note    Attendees: 12/20         Patient's Goal:  To improve decision making skills/ improve goal setting skills    Notes:   Pt is pleasant and participated well     Status After Intervention:  Improved    Participation Level:  Active Listener    Participation Quality: Appropriate      Speech:  normal      Thought Process/Content: Logical      Affective Functioning: Congruent      Mood: euthymic      Level of consciousness:  Alert      Response to Learning: Able to verbalize current knowledge/experience and Progressing to goal      Endings: None Reported    Modes of Intervention: Education, Support and Problem-solving      Discipline Responsible: Psychoeducational Specialist      Signature:  Marnie Robert

## 2020-02-07 NOTE — PROGRESS NOTES
Donald Xiong MD      Examination:  /76   Pulse 85   Temp 98 °F (36.7 °C) (Oral)   Resp 14   Ht 5' 6\" (1.676 m)   Wt 131 lb (59.4 kg)   LMP  (LMP Unknown)   BMI 21.14 kg/m²   Gait - steady  Medication side effects(SE): NONE    Mental Status Examination:    Level of consciousness:  within normal limits   Appearance:  fair grooming and fair hygiene  Behavior/Motor:  no abnormalities noted  Attitude toward examiner:  hostiile  Speech:  loud  Mood: irritable  Affect:  mood congruent and intense  Thought processes:  overabundance of ideas   Thought content:  Homocidal ideation none  Suicidal Ideation:  denies suicidal ideation  Delusions:  no evidence of delusions  Perceptual Disturbance:  denies any perceptual disturbance  Cognition:  oriented to person, place, and time   Concentration- normal  Insight good  Judgement good    ASSESSMENT:   Patient symptoms are:  [] Well controlled  [x] Improving  [] Worsening  [] No change      Diagnosis: Adamaris  Active Problems:    Bipolar 1 disorder (HCC)  Resolved Problems:    * No resolved hospital problems. *      LABS:    No results for input(s): WBC, HGB, PLT in the last 72 hours. No results for input(s): NA, K, CL, CO2, BUN, CREATININE, GLUCOSE in the last 72 hours. No results for input(s): BILITOT, ALKPHOS, AST, ALT in the last 72 hours. Lab Results   Component Value Date    BARBSCNU NEGATIVE 02/05/2020    LABBENZ NEGATIVE 02/05/2020    LABMETH NEGATIVE 02/05/2020    PPXUR NOT REPORTED 02/05/2020     Lab Results   Component Value Date    TSH 0.96 02/03/2020     No results found for: LITHIUM  No results found for: VALPROATE, CBMZ    RISK ASSESSMENT: low risk of suicide or harm to others      Treatment Plan:  Reviewed current Medications with the patient. Continue Latuda to 60 mg daily.      I have offered the patient Invega followed by Krupa Burns but the patient remains firm in refusing it and wants to continue with oral Latuda    Risks, benefits, side effects, drug-to-drug interactions and alternatives to treatment were discussed. The patient  consented to treatment. Encourage patient to attend group and other milieu activities. Discharge planning discussed with the patient and treatment team.    PSYCHOTHERAPY/COUNSELING:  Patient was seen 1:1 for 20 minutes, other than E&M time spent, focusing on        -Used CBT techniques to help the patient develop a plan to take her medication regularly and to recognize that not taking her medication at a time of stress is likely to be detrimental for mental health    -    [] Therapeutic interview  [x] Supportive  [x] CBT  [] Ongoing  [] Other    [x] Patient continues to need, on a daily basis, active treatment furnished directly by or requiring the supervision of inpatient psychiatric personnel      Anticipated Length of stay: 3-5 days. Electronically signed by Shannan Jaeger MD on 2/7/2020 at 1:37 PM    Please note that this chart was generated using voice recognition Dragon dictation software. Although every effort was made to ensure the accuracy of this automated transcription, some errors in transcription may have occurred.

## 2020-02-07 NOTE — GROUP NOTE
Group Therapy Note    Date: 2/7/2020    Group Start Time: 1330  Group End Time: 4300  Group Topic: Recovery    [facility]    LACEY Johnson LSW        Group Therapy Note    Attendees: 6/16       Patient's Goal:  Increase understanding of addiction and recovery process. Notes:  Pt is making progress AEB participating in group discussion about self-care and eliminating barriers. Status After Intervention:  Improved    Participation Level:  Active Listener and Interactive    Participation Quality: Appropriate, Attentive and Supportive      Speech:  normal      Thought Process/Content: Logical      Affective Functioning: Congruent      Mood: euthymic      Level of consciousness:  Alert, Oriented x4 and Attentive      Response to Learning: Progressing to goal      Endings: None Reported    Modes of Intervention: Education, Support, Socialization, Exploration, Clarifying and Problem-solving      Discipline Responsible: /Counselor      Signature:  LACEY Johnson LSW, [place]

## 2020-02-07 NOTE — CARE COORDINATION
Pt reported she was no longer interested in inpatient AOD tx, and is going to stay with her mother upon discharge.

## 2020-02-07 NOTE — GROUP NOTE
Group Therapy Note    Date: 2/7/2020    Group Start Time: 1000  Group End Time: 7078  Group Topic: Recreational    [facility]    Cristal Patel        Group Therapy Note    Attendees: 5/9         Patient's Goal:  To increase interpersonal interaction    Notes:      Status After Intervention:  Improved    Participation Level:  Active Listener and Interactive    Participation Quality: Appropriate, Attentive and Sharing      Speech:  normal      Thought Process/Content: Logical  Linear      Affective Functioning: Congruent      Mood: euthymic      Level of consciousness:  Alert, Oriented x4 and Attentive      Response to Learning: Able to verbalize current knowledge/experience, Able to verbalize/acknowledge new learning, Able to retain information and Progressing to goal      Endings: None Reported    Modes of Intervention: Education, Support, Socialization, Exploration, Clarifying, Problem-solving and Activity      Discipline Responsible: Psychoeducational Specialist      Signature:  Cristal Patel

## 2020-02-07 NOTE — GROUP NOTE
Group Therapy Note    Date: 2/7/2020    Group Start Time: 1430  Group End Time: 7701  Group Topic: Psychoeducation    [facility]    Paulino Chan        Group Therapy Note    Attendees: 6/15         Patient's Goal:  To increase interpersonal interaction    Notes:      Status After Intervention:  Improved    Participation Level:  Active Listener and Interactive    Participation Quality: Appropriate, Attentive and Sharing      Speech:  normal      Thought Process/Content: Logical  Linear      Affective Functioning: Congruent       Mood: euthymic      Level of consciousness:  Alert, Oriented x4 and Attentive      Response to Learning: Able to verbalize current knowledge/experience, Able to verbalize/acknowledge new learning, Able to retain information and Progressing to goal      Endings: None Reported    Modes of Intervention: Education, Support, Socialization, Exploration, Clarifying, Problem-solving and Activity      Discipline Responsible: Psychoeducational Specialist      Signature:  Paulino Chan

## 2020-02-08 PROCEDURE — 1240000000 HC EMOTIONAL WELLNESS R&B

## 2020-02-08 PROCEDURE — 6370000000 HC RX 637 (ALT 250 FOR IP): Performed by: PSYCHIATRY & NEUROLOGY

## 2020-02-08 PROCEDURE — 99232 SBSQ HOSP IP/OBS MODERATE 35: CPT | Performed by: NURSE PRACTITIONER

## 2020-02-08 RX ADMIN — LURASIDONE HYDROCHLORIDE 60 MG: 60 TABLET, FILM COATED ORAL at 17:59

## 2020-02-08 NOTE — GROUP NOTE
Group Therapy Note    Date: 2/8/2020    Group Start Time: 0092  Group End Time: 6922  Group Topic: Group Therapy    [facility]    Sen Kevin RN        Group Therapy Note    Attendees: 9         Patient's Goal:  To get set up with follow up care at time of discharge in [facility]    Notes:      Status After Intervention:  Unchanged    Participation Level:  Active Listener and Interactive    Participation Quality: Appropriate and Attentive      Speech:  normal      Thought Process/Content: Logical  Linear      Affective Functioning: Congruent      Mood: euthymic      Level of consciousness:  Alert and Oriented x4      Response to Learning: Able to verbalize current knowledge/experience and Able to verbalize/acknowledge new learning      Endings: None Reported    Modes of Intervention: Education and Support      Discipline Responsible: Registered Nurse      Signature:  Sen Kevin RN

## 2020-02-08 NOTE — GROUP NOTE
Group Therapy Note    Date: 2/8/2020    Group Start Time: 1100  Group End Time: 7684  Group Topic: Group Documentation    [facility]    Grant Marcelo LPN        Group Therapy Note    Attendees: 8/17         Patient's Goal:  Participate     Notes:  motivate    Status After Intervention:  Improved    Participation Level:  Active Listener    Participation Quality: Sharing      Speech:  normal      Thought Process/Content: Logical      Affective Functioning: Flat      Mood: euthymic      Level of consciousness:  Oriented x4      Response to Learning: Able to verbalize/acknowledge new learning      Endings: None Reported    Modes of Intervention: Education      Discipline Responsible: Licensed Practical Nurse      Signature:  Grant Marcelo LPN

## 2020-02-08 NOTE — PROGRESS NOTES
Department of Psychiatry  Attending Progress Note  Chief Complaint: Bipolar 1 disorder ([facility] [address]     SUBJECTIVE:  Chantal is seen in the day room today. She states she feels better today. She states she feels her mood is more stable. She is tolerating medication without side effects. She states her sleep and appetite are good. She attends groups and is social with select peers in the day room. She currently denies SI/HI/AVH. She is hoping to be discharged home Monday. Patient feels helpless and hopeless at times about current situation and cannot contract for safety outside of the hospital setting. Support and reassurance provided. Charting and medications reviewed. There is no safe alternative at this time than inpatient hospitalization. OBJECTIVE    Physical  /69   Pulse 83   Temp 98 °F (36.7 °C) (Oral)   Resp 14   Ht 5' 6\" (1.676 m)   Wt 131 lb (59.4 kg)   LMP  (LMP Unknown)   BMI 21.14 kg/m²      Mental Status Evaluation:  Orientation: alertness: alert   Mood:. anxious      Affect:  constricted      Appearance:  casually dressed   Activity:  Restless & fidgety   Gait/Posture: Normal   Speech:  normal pitch and normal volume   Thought Process:  circumstantial   Thought Content:  Denies paranoia or hallucinations   Sensorium:  person, place, time/date and situation   Cognition:  grossly intact   Memory: intact   Insight:  limited   Judgment: age appropriate   Suicidal Ideations: denies suicidal ideation   Homicidal Ideations: Negative for homicidal ideation      Medication Side Effects: absent       Attention Span attention span appeared shorter than expected for age       Labs  No results found for this or any previous visit (from the past 67 hour(s)).     Medications  Current Facility-Administered Medications   Medication Dose Route Frequency Provider Last Rate Last Dose    lurasidone (LATUDA) tablet 60 mg  60 mg Oral Dinner Ty Capone MD   60 mg at 02/07/20 3266    acetaminophen

## 2020-02-08 NOTE — BH NOTE
Patients mother called stating patient was calling asking she tell staff to have her discharged Monday, mother reports concern that the patient is not yet ready for discharge due to continued manic phone calls regarding her property, reassurance given patient could not be discharged without a doctors order.

## 2020-02-08 NOTE — GROUP NOTE
Group Therapy Note    Date: 2/8/2020    Group Start Time: 1350  Group End Time: 1500  Group Topic: Cognitive Skills    [facility]    VALARIE Johnson        Group Therapy Note    Attendees: 10/17       Patient's Goal:  To increase social interaction and to practice expressing feelings, and exploring positive coping skills and resources      Notes: Pt participated fully in group task . Pt was able to expressing feelings, and exploring positive coping skills and resources through creative expression and then sharing feelings, thoughts, ideas with peers. Pt also participated in groups discussion with peers and RT r/t coping skills. Pt is pleasant and supportive of peers, and able to relate to some of their thoughts,experiences, and feelings     Status After Intervention:  Improved     Participation Level:  Active Listener and Interactive     Participation Quality: Appropriate, Attentive, Sharing and Supportive        Speech:  normal, less intrusive-allows peers to take their turns         Thought Process/Content: Logical, Linear, slightly grandiose at times but redirects with support from peer           Affective Functioning: Congruent        Mood: euthymic        Level of consciousness:  Alert, Oriented x4 and Attentive        Response to Learning: Able to verbalize current knowledge/experience, Able to verbalize/acknowledge new learning, Able to retain information and Progressing to goal        Endings: None Reported     Modes of Intervention: Education, Support, Socialization, Exploration, Clarifying and Problem-solving        Discipline Responsible: Psychoeducational Specialist        Signature:  Searcy Gilford, CTRS

## 2020-02-09 PROCEDURE — 6370000000 HC RX 637 (ALT 250 FOR IP): Performed by: PSYCHIATRY & NEUROLOGY

## 2020-02-09 PROCEDURE — 1240000000 HC EMOTIONAL WELLNESS R&B

## 2020-02-09 PROCEDURE — 99232 SBSQ HOSP IP/OBS MODERATE 35: CPT | Performed by: REGISTERED NURSE

## 2020-02-09 RX ADMIN — LURASIDONE HYDROCHLORIDE 60 MG: 60 TABLET, FILM COATED ORAL at 17:53

## 2020-02-09 NOTE — PROGRESS NOTES
Department of Psychiatry  Attending Progress Note  Chief Complaint: Bipolar 1 disorder ([facility] [address]     SUBJECTIVE:  Stephanie Brunson is seen in the day room today. She states she feels better today. She states she feels her mood is even more stable. She is compliant with medications. She denies side effects. She states her sleep and appetite are good. She attends groups and her ADL's. She is social with her peers. Support and reassurance provided. Charting and medications reviewed. She hopes to be discharged tomorrow. OBJECTIVE    Physical  /67   Pulse 87   Temp 97.7 °F (36.5 °C) (Oral)   Resp 14   Ht 5' 6\" (1.676 m)   Wt 131 lb (59.4 kg)   LMP  (LMP Unknown)   SpO2 100%   BMI 21.14 kg/m²      Mental Status Evaluation:  Orientation: alertness: alert   Mood:. anxious      Affect:  constricted      Appearance:  casually dressed   Activity:  Restless & fidgety   Gait/Posture: Normal   Speech:  normal pitch and normal volume   Thought Process:  circumstantial   Thought Content:  Denies paranoia or hallucinations   Sensorium:  person, place, time/date and situation   Cognition:  grossly intact   Memory: intact   Insight:  limited   Judgment: age appropriate   Suicidal Ideations: denies suicidal ideation   Homicidal Ideations: Negative for homicidal ideation      Medication Side Effects: absent       Attention Span attention span appeared shorter than expected for age       Labs  No results found for this or any previous visit (from the past 67 hour(s)).     Medications  Current Facility-Administered Medications   Medication Dose Route Frequency Provider Last Rate Last Dose    lurasidone (LATUDA) tablet 60 mg  60 mg Oral Dinner Jarvis Willoughby MD   60 mg at 02/08/20 7580    acetaminophen (TYLENOL) tablet 650 mg  650 mg Oral Q4H PRN Jarvis Willoughby MD        magnesium hydroxide (MILK OF MAGNESIA) 400 MG/5ML suspension 30 mL  30 mL Oral Daily PRN Jarvis Willoughby MD        LORazepam (ATIVAN) tablet 1 mg  1 mg Oral Q4H PRN Ananth Villegas MD        Or    LORazepam (ATIVAN) injection 1 mg  1 mg Intramuscular Q4H PRN Ananth Villegas MD        haloperidol lactate (HALDOL) injection 5 mg  5 mg Intramuscular Q4H PRN Ananth Villegas MD        Or    haloperidol (HALDOL) tablet 5 mg  5 mg Oral Q4H PRN Ananth Villegas MD        traZODone (DESYREL) tablet 50 mg  50 mg Oral Nightly PRN Mike Blanc MD             lurasidone  60 mg Oral Dinner       ASSESSMENT  Bipolar 1 disorder ([facility] [address]     Patient's Response to Treatment: positive    PLAN:  1. Continue inpatient hospitalization  2. Medication management. Continue Latuda 60 mg daily. 3. Participation in groups and therapeutic milieu  4. Social work for discharge planning, possible discharge home Monday if continued improvement. Electronically signed by ZACK Bhatia on 2/9/2020 at 2:33 PM.    Dragon voice recognition software used in portions of this document.

## 2020-02-09 NOTE — GROUP NOTE
Group Therapy Note    Date: 2/9/2020    Group Start Time: 1600  Group End Time: 1630  Group Topic: Group Documentation    [facility]    Aileen Krabbe, LPN        Group Therapy Note    Attendees: 18/18         Patient's Goal:  Safety on milieu    Notes: Tolerated well    Status After Intervention:  Unchanged    Participation Level:  Active Listener    Participation Quality: Appropriate      Speech:  normal      Thought Process/Content: Logical      Affective Functioning: Flat      Mood: euthymic      Level of consciousness:  Alert      Response to Learning: Able to retain information      Endings: None Reported    Modes of Intervention: Education      Discipline Responsible: Licensed Practical Nurse      Signature:  Aileen Krabbe, LPN

## 2020-02-09 NOTE — BH NOTE
Date: 2/8/2020     Group Start Time: 0830  Group End Time: 0900  Group Topic: Wrap-Up     [facility]    Lew Carpio RN           Group Therapy Note     Attendees: 9/17           Patient's Goal:  Wrap Up group     Notes:  Short and long term goals     Status After Intervention:  Improved     Participation Level:  Active Listener and Interactive     Participation Quality: Appropriate, Attentive and Sharing        Speech:  normal        Thought Process/Content: Logical        Affective Functioning: Congruent        Mood: within normal limits        Level of consciousness:  Alert, Oriented x4 and Attentive        Response to Learning: Able to verbalize current knowledge/experience, Able to verbalize/acknowledge new learning, Able to retain information and Progressing to goal        Endings: None Reported     Modes of Intervention: Education, Support and Socialization        Discipline Responsible: RN        Signature:  Lew Carpio

## 2020-02-09 NOTE — GROUP NOTE
Group Therapy Note    Date: 2/9/2020    Group Start Time: 0915  Group End Time: 0945  Group Topic: [address] [facility] [place]        Group Therapy Note    Attendees: 9/18           Patient's Goal:  To increase social interaction and to explore and identify short term goals r/t wellness and recovery. RT discussed group schedule and unit structure/resources and encouraged pts to be engaged in their treatment. Pts were given opportunities to ask questions. Notes: Pt participated in group task and was able to identify short term goals r/t wellness and recovery. Pt is pleasant and supportive of peers        Status After Intervention:  Improved     Participation Level:  Active Listener and Interactive     Participation Quality: Appropriate, Attentive, Sharing         Speech:   Normal     Thought Process/Content: Logical,linear     Affective Functioning: Congruent     Mood: euthymic        Level of consciousness:  Alert, and Attentive        Response to Learning: Able to verbalize current knowledge/experience, Able to verbalize/acknowledge new learning, and Progressing to goal        Endings: None Reported     Modes of Intervention: Education, Support, Socialization, Exploration, Clarifying and Problem-solving        Discipline Responsible: Psychoeducational Specialist        Signature:  Sadiq Caballero

## 2020-02-09 NOTE — GROUP NOTE
Group Therapy Note    Date: 2/9/2020    Group Start Time: 0807  Group End Time: 1440  Group Topic: Cognitive Skills    [facility]    VALARIE Tripp        Group Therapy Note    Attendees: 11/18         Patient's Goal:  To increase social interaction and to practice problem solving,  and communication skills. Notes: Pt participated fully in group task . Pt was able to practice problem solving,  and communication skills working in small team of peers and sharing with larger group. Status After Intervention:  Improved     Participation Level:  Active Listener and Interactive     Participation Quality: Appropriate, Attentive, Sharing and Supportive        Speech:  normal        Thought Process/Content: Logical  Linear        Affective Functioning: Congruent        Mood: euthymic, pt was bright and social         Level of consciousness:  Alert, Oriented x4 and Attentive        Response to Learning: Able to verbalize current knowledge/experience, Able to verbalize/acknowledge new learning, Able to retain information and Progressing to goal        Endings: None Reported     Modes of Intervention: Education, Support, Socialization, Exploration, Clarifying and Problem-solving        Discipline Responsible: Psychoeducational Specialist        Signature:  VALARIE Tan

## 2020-02-09 NOTE — GROUP NOTE
Group Therapy Note    Date: 2/9/2020    Group Start Time: 1100  Group End Time: 2831  Group Topic: Group Documentation    [facility]    Maddi Jimenez LPN        Group Therapy Note    Attendees: 9/18         Patient's Goal:  participate     Notes:  motivate    Status After Intervention:  Improved    Participation Level:  Active Listener    Participation Quality: Intrusive      Speech:  normal      Thought Process/Content: Logical      Affective Functioning: Blunted      Mood: euphoric      Level of consciousness:  Oriented x4      Response to Learning: Able to verbalize current knowledge/experience      Endings: None Reported    Modes of Intervention: Support      Discipline Responsible: Licensed Practical Nurse      Signature:  Maddi Jimenez LPN

## 2020-02-10 VITALS
DIASTOLIC BLOOD PRESSURE: 78 MMHG | HEIGHT: 66 IN | TEMPERATURE: 97.7 F | OXYGEN SATURATION: 100 % | WEIGHT: 131 LBS | RESPIRATION RATE: 14 BRPM | SYSTOLIC BLOOD PRESSURE: 122 MMHG | HEART RATE: 78 BPM | BODY MASS INDEX: 21.05 KG/M2

## 2020-02-10 PROBLEM — F30.9 MANIA (HCC): Status: ACTIVE | Noted: 2020-02-10

## 2020-02-10 PROCEDURE — 5130000000 HC BRIDGE APPOINTMENT

## 2020-02-10 PROCEDURE — 99238 HOSP IP/OBS DSCHRG MGMT 30/<: CPT | Performed by: PSYCHIATRY & NEUROLOGY

## 2020-02-10 PROCEDURE — 90833 PSYTX W PT W E/M 30 MIN: CPT | Performed by: PSYCHIATRY & NEUROLOGY

## 2020-02-10 NOTE — CARE COORDINATION
Bridge Appointment completed: Reviewed Discharge Instructions with patient. Patient verbalizes understanding and agreement with the discharge plan using the teachback method.        Discharge Arrangements:  [facility]   [address] [facility], [address] [place] [address]  [phone]  [phone]   You hasve a scheduled appointment on Friday February 14th at 2:10 pm with Lashell Caputo notified: PT is her own guardian   Discharge [address], [address] [place] [address]  Transported by:  [employer] will schedule complimentary cab for PT

## 2020-02-10 NOTE — PLAN OF CARE
[facility]  Initial Interdisciplinary Treatment Plan NO      Original treatment plan Date & Time: 2/3/2020                   739AM    Admission Type:  Admission Type: Involuntary    Reason for admission:   Reason for Admission: SI no plan    Estimated Length of Stay:  5-7days  Estimated Discharge Date: to be determined by physician    PATIENT STRENGTHS:  Patient Strengths:Strengths: Positive Support, No significant Physical Illness  Patient Strengths and Limitations:Limitations: Tendency to isolate self, Lacks leisure interests, Difficulty problem solving/relies on others to help solve problems, Hopeless about future, Multiple barriers to leisure interests, Inappropriate/potentially harmful leisure interests (depression substance abuse anxiety poor coping skills)  Addictive Behavior: Addictive Behavior  In the past 3 months, have you felt or has someone told you that you have a problem with:  : None  Do you have a history of Chemical Use?: No  Do you have a history of Alcohol Use?: No  Do you have a history of Street Drug Abuse?: Yes  Histroy of Prescripton Drug Abuse?: No  Medical Problems:No past medical history on file.   Status EXAM:Status and Exam  Normal: No  Facial Expression: Elevated  Affect: Inappropriate  Level of Consciousness: Alert  Mood:Normal: No  Mood: Depressed, Anxious  Motor Activity:Normal: No  Motor Activity: Decreased  Interview Behavior: Cooperative  Preception: [place] to Person, Lissy Cables to Time, [place] to Place, [place] to Situation  Attention:Normal: Yes  Attention: Distractible  Thought Processes: Circumstantial  Thought Content:Normal: Yes  Thought Content: Preoccupations  Hallucinations: None  Delusions: No  Memory:Normal: Yes  Memory: Poor Recent, Confabulation  Insight and Judgment: No  Insight and Judgment: Unmotivated  Present Suicidal Ideation: No  Present Homicidal Ideation: No    EDUCATION:   Learner Progress Toward Treatment Goals: reviewed group plans and
Problem: Altered Mood, Depressive Behavior:  Goal: Ability to disclose and discuss suicidal ideas will improve  Description  Ability to disclose and discuss suicidal ideas will improve  2/3/2020 1213 by Rickey Ritter RN  Outcome: Ongoing  Note:   Patient denies any thoughts of self harm or hallucinations. Out for meals. Behavior controlled, attending select groups. Did not shower this morning.
Problem: Altered Mood, Depressive Behavior:  Goal: Ability to disclose and discuss suicidal ideas will improve  Description  Ability to disclose and discuss suicidal ideas will improve  2/4/2020 1319 by Michoacano Chapa RN  Outcome: Ongoing  Note:   Patient denies any thoughts of self harm or hallucinations. Out and very social, behavior controlled. Out for meals and attending select groups.
Problem: Altered Mood, Depressive Behavior:  Goal: Ability to disclose and discuss suicidal ideas will improve  Description  Ability to disclose and discuss suicidal ideas will improve  2/8/2020 0111 by Jose Francisco Barahona  Outcome: Ongoing  Note:   Patient is brightened and social with peers . Patient denies suicidal ideation. Patient agrees to seek out staff if thoughts of self harm arise. Staff continues to provide a safe and therapeutic milieu. Staff maintains Q 15 minute safety checks.     Electronically signed by Jose Francisco Barahona RN on 2/8/2020 at 1:16 AM
Problem: Altered Mood, Depressive Behavior:  Goal: Ability to disclose and discuss suicidal ideas will improve  Description  Ability to disclose and discuss suicidal ideas will improve  2/8/2020 2125 by Nikki Soto RN  Outcome: Ongoing  Patient states they are not having thoughts of self harm at this time and verbally agrees to seek staff if feelings of harming self arise. Patient behavior controlled and accepting of medications,medications flat,denies audio hallucination and visual hallucinations patient eating and sleeping well. Staff will continue to monitor for safety and offer support as needed.
Problem: Altered Mood, Depressive Behavior:  Goal: Able to verbalize and/or display a decrease in depressive symptoms  Description  Able to verbalize and/or display a decrease in depressive symptoms  2/3/2020 2334 by Mitesh Bender LPN  Outcome: Ongoing  Note:   Patient denies depression/anxiety at this time. Patient is out and social with peers. Problem: Altered Mood, Depressive Behavior:  Goal: Ability to disclose and discuss suicidal ideas will improve  Description  Ability to disclose and discuss suicidal ideas will improve  2/3/2020 2334 by Mitesh Bender LPN  Outcome: Ongoing  Note:   Patient denies suicidal ideation at this time. Patient is pleasant and cooperative.
Problem: Altered Mood, Depressive Behavior:  Goal: Able to verbalize and/or display a decrease in depressive symptoms  Description  Able to verbalize and/or display a decrease in depressive symptoms  2/4/2020 2157 by Yoav Maloney LPN  Outcome: Met This Shift  Note:   Patient denies depressive symptoms at this time. Patient social in day room, singing hyper verbal     Problem: Altered Mood, Depressive Behavior:  Goal: Ability to disclose and discuss suicidal ideas will improve  Description  Ability to disclose and discuss suicidal ideas will improve  2/4/2020 2157 by Yoav Maloney LPN  Outcome: Met This Shift  Note:   Pt denies suicidal ideation at this time, Pt agrees to seek staff help should the thought of suicide arise. Staff will provide reassurance as needed. 15 minute checks maintained for patient safety.
Problem: Altered Mood, Depressive Behavior:  Goal: Able to verbalize and/or display a decrease in depressive symptoms  Description  Able to verbalize and/or display a decrease in depressive symptoms  2/5/2020 0956 by James Schaffer RN  Outcome: Ongoing  Note:   Ms. Geeta Braswell is pleasant and approachable. She demonstrates poor insight into her illness and why she is here. Ms. Geeta Braswell attends groups and socializes with select peers. She denies auditory and visual hallucinations, she denies suicidal ideations and thoughts of harm to self and others. Hygiene and grooming habits are adequate. Safety rounds maintained.
Problem: Altered Mood, Depressive Behavior:  Goal: Able to verbalize and/or display a decrease in depressive symptoms  Description  Able to verbalize and/or display a decrease in depressive symptoms  2/6/2020 2243 by Kylie Carter  Outcome: Ongoing  Note:   Patient is social with peers in milieu. Patient verbalizes that her mood has been better. and appears brightened. Staff continues to maintain a safe and therapeutic milieu. Staff maintains Q 15 minute rounding for safety.
Problem: Altered Mood, Depressive Behavior:  Goal: Able to verbalize and/or display a decrease in depressive symptoms  Description  Able to verbalize and/or display a decrease in depressive symptoms  Outcome: Ongoing  Note:   Patient is alert and oriented x4. Patient denies all suicidal ideations at this time. Patient is calm and cooperative with staff. Patient is medication compliant and eager to d/c today. Patient attends groups and is out social with peers. Staff will continue to do 15 minute safety checks per facility protocol. Problem: Altered Mood, Depressive Behavior:  Goal: Ability to disclose and discuss suicidal ideas will improve  Description  Ability to disclose and discuss suicidal ideas will improve  2/10/2020 1115 by Buddy Garcia LPN  Outcome: Ongoing  Note:   Patient is alert and oriented x4. Patient denies all suicidal ideations and states her over all symptoms have decreased. Patient is currently denying all anxiety and depression as well. Patient is eager to d/c home today. Patient is calm and cooperative with staff and peers. Patient attends groups and appetite has increased appropriately. Staff will continue to do 15 minute safety checks per facility protocol.
Problem: Altered Mood, Depressive Behavior:  Goal: Able to verbalize and/or display a decrease in depressive symptoms  Description  Able to verbalize and/or display a decrease in depressive symptoms  Outcome: Ongoing  Note:   Patient is cooperative and med compliant. She denies any suicidal or homicidal ideations but verbalizes depression and anxiety. She has been educated on the importance of med stabilization and groups, patient verbalized understanding.      Problem: Altered Mood, Depressive Behavior:  Goal: Ability to disclose and discuss suicidal ideas will improve  Description  Ability to disclose and discuss suicidal ideas will improve  Outcome: Ongoing
Problem: Altered Mood, Depressive Behavior:  Goal: Able to verbalize and/or display a decrease in depressive symptoms  Description  Able to verbalize and/or display a decrease in depressive symptoms  Outcome: Ongoing  Note:   Patient is cooperative, med compliant with fair eye contact and thought blocking tendencies. She denies any suicidal or homicidal ideations but verbalizes depression and anxiety. She has been educated on the importance of med stabilization and groups, patient verbalized understanding.      Problem: Altered Mood, Depressive Behavior:  Goal: Ability to disclose and discuss suicidal ideas will improve  Description  Ability to disclose and discuss suicidal ideas will improve  Outcome: Ongoing
Problem: Altered Mood, Depressive Behavior:  Goal: Able to verbalize and/or display a decrease in depressive symptoms  Description  Able to verbalize and/or display a decrease in depressive symptoms  Outcome: Ongoing  Note:   Patient is cooperative, med compliant with fair eye contact and thought blocking tendencies. She denies any suicidal or homicidal ideations but verbalizes depression and anxiety. She has been educated on the importance of med stabilization and groups, patient verbalized understanding.      Problem: Altered Mood, Depressive Behavior:  Goal: Ability to disclose and discuss suicidal ideas will improve  Description  Ability to disclose and discuss suicidal ideas will improve  Outcome: Ongoing
Ideation: No    Daily Assessment Last Entry:   Daily Sleep (WDL): Within Defined Limits         Patient Currently in Pain: No  Daily Nutrition (WDL): Within Defined Limits    Patient Monitoring:  Frequency of Checks: 4 times per hour, close    Psychiatric Symptoms:   Depression Symptoms  Depression Symptoms: Isolative, Loss of interest  Anxiety Symptoms  Anxiety Symptoms: Generalized  Adamaris Symptoms  Adamaris Symptoms: No problems reported or observed. Psychosis Symptoms  Delusion Type: No problems reported or observed. Suicide Risk CSSR-S:  Have you wished you were dead or wished you could go to sleep and not wake up? : NO  Have you actually had any thoughts of killing yourself? : NO  Have you ever done anything, started to do anything, or prepared to do anything to end your life?: NO  Change in Result         NO                Change in Plan of care      NO      EDUCATION:   EDUCATION:   Learner Progress Toward Treatment Goals: Reviewed results and recommendations of this team, Reviewed group plan and strategies, Reviewed signs, symptoms and risk of self harm and violent behavior, Reviewed goals and plan of care    Method:small group, individual verbal education    Outcome:verbalized by patient, but needs reinforcement to obtain goals    PATIENT GOALS:  Short term: \"GET STABLE ON MEDS, EXPLORE SHELTERS IN [facility]"  Long term:  \"FOLLOW UP ,GO BACK TO [place] [address]"    PLAN/TREATMENT RECOMMENDATIONS UPDATE: continue with group therapies, increased socialization, continue planning for after discharge goals, continue with medication compliance    SHORT-TERM GOALS UPDATE:   Time frame for Short-Term Goals: 5-7 days    LONG-TERM GOALS UPDATE:   Time frame for Long-Term Goals: 6 months  Members Present in Team Meeting: See Signature Sheet    Fabien El

## 2020-02-10 NOTE — GROUP NOTE
Group Therapy Note    Date: 2/9/2020    Group Start Time: 2130  Group End Time: 2230  Group Topic: Relaxation    [facility]    Alondra Valencia        Group Therapy Note    Attendees: 16/18         Patient's Goal:  Relaxation Group    Notes:  Talk time and socialization    Status After Intervention:  Improved    Participation Level:  Active Listener and Interactive    Participation Quality: Appropriate, Attentive and Supportive      Speech:  normal      Thought Process/Content: Logical      Affective Functioning: Congruent      Mood: within normal limits      Level of consciousness:  Alert, Oriented x4 and Attentive      Response to Learning: Progressing to goal      Endings: None Reported    Modes of Intervention: Support and Socialization      Discipline Responsible: [address], [employer]      Signature:  Alondra Valencia

## 2020-02-10 NOTE — DISCHARGE SUMMARY
Education provided on the complaince with treatment. Risks, benefits, side effects, drug-to-drug interactions and alternatives to treatment were discussed. Encourage patient to attend outpatient follow up appointment and therapy. Patient was advised to call the outpatient provider, visit the nearest ED or call 911 if symptoms are not manageable. Patient's mother was contacted prior to the discharge. Medication List      CHANGE how you take these medications    lurasidone 60 MG Tabs tablet  Commonly known as:  LATUDA  Take 1 tablet by mouth Daily with supper  What changed:    · medication strength  · how much to take  · when to take this           Where to Get Your Medications      These medications were sent to [facility], [facility] [address]  [facility] [address] [address]    Phone:  [phone]   · lurasidone 60 MG Tabs tablet         Patient was seen 1:1 for 20 minutes, other than E&M time spent, focusing on           -CBT principles to look at the evidence for the patient's symptoms and her response to medication. We revisited the fact that the patient tends to discontinue her medications when she is stressed which then leads to relapse. .          - Motivational interviewing to assess the stage of change and assessing patient readiness to quit substance use. TIME SPENT - 35 MINUTES TO COMPLETE THE EVALUATION, DISCHARGE SUMMARY, MEDICATION RECONCILIATION AND FOLLOW UP CARE     Signed:  Edgar Logan  2/10/2020  10:52 AM    Please note that this chart was generated using voice recognition Dragon dictation software. Although every effort was made to ensure the accuracy of this automated transcription, some errors in transcription may have occurred.

## 2020-02-10 NOTE — BH NOTE
Patient given tobacco quitline number 33409142337 at this time, refusing to call at this time, states \" I just dont want to quit now\"- patient given information as to the dangers of long term tobacco use. Continue to reinforce the importance of tobacco cessation.

## 2023-07-29 ENCOUNTER — HOSPITAL ENCOUNTER (EMERGENCY)
Age: 28
Discharge: OTHER INSTITUTION | End: 2023-07-29
Payer: COMMERCIAL

## 2023-07-29 VITALS
TEMPERATURE: 98.3 F | RESPIRATION RATE: 16 BRPM | SYSTOLIC BLOOD PRESSURE: 121 MMHG | BODY MASS INDEX: 21.79 KG/M2 | OXYGEN SATURATION: 97 % | HEART RATE: 73 BPM | DIASTOLIC BLOOD PRESSURE: 82 MMHG | HEIGHT: 66 IN | WEIGHT: 135.6 LBS

## 2023-07-29 DIAGNOSIS — S61.210A LACERATION OF RIGHT INDEX FINGER WITHOUT FOREIGN BODY WITHOUT DAMAGE TO NAIL, INITIAL ENCOUNTER: ICD-10-CM

## 2023-07-29 DIAGNOSIS — E87.6 HYPOKALEMIA: ICD-10-CM

## 2023-07-29 DIAGNOSIS — F29 PSYCHOSIS, UNSPECIFIED PSYCHOSIS TYPE (HCC): Primary | ICD-10-CM

## 2023-07-29 LAB
ALBUMIN SERPL-MCNC: 4.9 G/DL (ref 3.5–4.6)
ALP SERPL-CCNC: 66 U/L (ref 40–130)
ALT SERPL-CCNC: 19 U/L (ref 0–33)
AMPHET UR QL SCN: NORMAL
ANION GAP SERPL CALCULATED.3IONS-SCNC: 21 MEQ/L (ref 9–15)
APAP SERPL-MCNC: <5 UG/ML (ref 10–30)
AST SERPL-CCNC: 48 U/L (ref 0–35)
BARBITURATES UR QL SCN: NORMAL
BASOPHILS # BLD: 0.1 K/UL (ref 0–0.2)
BASOPHILS NFR BLD: 0.7 %
BENZODIAZ UR QL SCN: NORMAL
BILIRUB SERPL-MCNC: 1.2 MG/DL (ref 0.2–0.7)
BILIRUB UR QL STRIP: NEGATIVE
BUN SERPL-MCNC: 12 MG/DL (ref 6–20)
CALCIUM SERPL-MCNC: 9.4 MG/DL (ref 8.5–9.9)
CANNABINOIDS UR QL SCN: NORMAL
CHLORIDE SERPL-SCNC: 98 MEQ/L (ref 95–107)
CHOLEST SERPL-MCNC: 127 MG/DL (ref 0–199)
CK SERPL-CCNC: 405 U/L (ref 0–170)
CLARITY UR: CLEAR
CO2 SERPL-SCNC: 15 MEQ/L (ref 20–31)
COCAINE UR QL SCN: NORMAL
COLOR UR: ABNORMAL
CREAT SERPL-MCNC: 0.99 MG/DL (ref 0.5–0.9)
DRUG SCREEN COMMENT UR-IMP: NORMAL
EOSINOPHIL # BLD: 0 K/UL (ref 0–0.7)
EOSINOPHIL NFR BLD: 0.3 %
ERYTHROCYTE [DISTWIDTH] IN BLOOD BY AUTOMATED COUNT: 12.9 % (ref 11.5–14.5)
ETHANOL PERCENT: NORMAL G/DL
ETHANOLAMINE SERPL-MCNC: <10 MG/DL (ref 0–0.08)
FENTANYL SCREEN, URINE: NORMAL
GLOBULIN SER CALC-MCNC: 2.7 G/DL (ref 2.3–3.5)
GLUCOSE SERPL-MCNC: 141 MG/DL (ref 70–99)
GLUCOSE UR STRIP-MCNC: NEGATIVE MG/DL
HCG UR QL: NEGATIVE
HCT VFR BLD AUTO: 39 % (ref 37–47)
HDLC SERPL-MCNC: 44 MG/DL (ref 40–59)
HGB BLD-MCNC: 13.7 G/DL (ref 12–16)
HGB UR QL STRIP: NEGATIVE
KETONES UR STRIP-MCNC: 15 MG/DL
LDLC SERPL CALC-MCNC: 70 MG/DL (ref 0–129)
LEUKOCYTE ESTERASE UR QL STRIP: NEGATIVE
LYMPHOCYTES # BLD: 3.8 K/UL (ref 1–4.8)
LYMPHOCYTES NFR BLD: 31.9 %
MCH RBC QN AUTO: 34.3 PG (ref 27–31.3)
MCHC RBC AUTO-ENTMCNC: 35.2 % (ref 33–37)
MCV RBC AUTO: 97.4 FL (ref 79.4–94.8)
METHADONE UR QL SCN: NORMAL
MONOCYTES # BLD: 0.7 K/UL (ref 0.2–0.8)
MONOCYTES NFR BLD: 5.9 %
NEUTROPHILS # BLD: 7.4 K/UL (ref 1.4–6.5)
NEUTS SEG NFR BLD: 61.2 %
NITRITE UR QL STRIP: NEGATIVE
OPIATES UR QL SCN: NORMAL
OXYCODONE UR QL SCN: NORMAL
PCP UR QL SCN: NORMAL
PH UR STRIP: 5.5 [PH] (ref 5–9)
PLATELET # BLD AUTO: 375 K/UL (ref 130–400)
POTASSIUM SERPL-SCNC: 2.9 MEQ/L (ref 3.4–4.9)
PROPOXYPH UR QL SCN: NORMAL
PROT SERPL-MCNC: 7.6 G/DL (ref 6.3–8)
PROT UR STRIP-MCNC: ABNORMAL MG/DL
RAPID HIV 1&2: NEGATIVE
RBC # BLD AUTO: 4.01 M/UL (ref 4.2–5.4)
SALICYLATES SERPL-MCNC: <0.3 MG/DL (ref 15–30)
SODIUM SERPL-SCNC: 134 MEQ/L (ref 135–144)
SP GR UR STRIP: 1.02 (ref 1–1.03)
TRIGL SERPL-MCNC: 67 MG/DL (ref 0–150)
TSH SERPL-MCNC: 1.36 UIU/ML (ref 0.44–3.86)
URINE REFLEX TO CULTURE: ABNORMAL
UROBILINOGEN UR STRIP-ACNC: 1 E.U./DL
WBC # BLD AUTO: 12 K/UL (ref 4.8–10.8)

## 2023-07-29 PROCEDURE — 84443 ASSAY THYROID STIM HORMONE: CPT

## 2023-07-29 PROCEDURE — 80053 COMPREHEN METABOLIC PANEL: CPT

## 2023-07-29 PROCEDURE — 80307 DRUG TEST PRSMV CHEM ANLYZR: CPT

## 2023-07-29 PROCEDURE — 36415 COLL VENOUS BLD VENIPUNCTURE: CPT

## 2023-07-29 PROCEDURE — 96372 THER/PROPH/DIAG INJ SC/IM: CPT

## 2023-07-29 PROCEDURE — 85025 COMPLETE CBC W/AUTO DIFF WBC: CPT

## 2023-07-29 PROCEDURE — 99285 EMERGENCY DEPT VISIT HI MDM: CPT

## 2023-07-29 PROCEDURE — 87389 HIV-1 AG W/HIV-1&-2 AB AG IA: CPT

## 2023-07-29 PROCEDURE — 80074 ACUTE HEPATITIS PANEL: CPT

## 2023-07-29 PROCEDURE — 82550 ASSAY OF CK (CPK): CPT

## 2023-07-29 PROCEDURE — 6360000002 HC RX W HCPCS: Performed by: PHYSICIAN ASSISTANT

## 2023-07-29 PROCEDURE — 81003 URINALYSIS AUTO W/O SCOPE: CPT

## 2023-07-29 PROCEDURE — 80179 DRUG ASSAY SALICYLATE: CPT

## 2023-07-29 PROCEDURE — 82077 ASSAY SPEC XCP UR&BREATH IA: CPT

## 2023-07-29 PROCEDURE — 86703 HIV-1/HIV-2 1 RESULT ANTBDY: CPT

## 2023-07-29 PROCEDURE — 80143 DRUG ASSAY ACETAMINOPHEN: CPT

## 2023-07-29 PROCEDURE — 80061 LIPID PANEL: CPT

## 2023-07-29 PROCEDURE — 84703 CHORIONIC GONADOTROPIN ASSAY: CPT

## 2023-07-29 RX ORDER — HALOPERIDOL 5 MG/ML
5 INJECTION INTRAMUSCULAR ONCE
Status: COMPLETED | OUTPATIENT
Start: 2023-07-29 | End: 2023-07-29

## 2023-07-29 RX ORDER — DIPHENHYDRAMINE HYDROCHLORIDE 50 MG/ML
25 INJECTION INTRAMUSCULAR; INTRAVENOUS ONCE
Status: COMPLETED | OUTPATIENT
Start: 2023-07-29 | End: 2023-07-29

## 2023-07-29 RX ORDER — POTASSIUM CHLORIDE 20 MEQ/1
40 TABLET, EXTENDED RELEASE ORAL ONCE
Status: DISCONTINUED | OUTPATIENT
Start: 2023-07-29 | End: 2023-07-30 | Stop reason: HOSPADM

## 2023-07-29 RX ORDER — LORAZEPAM 2 MG/ML
2 INJECTION INTRAMUSCULAR ONCE
Status: COMPLETED | OUTPATIENT
Start: 2023-07-29 | End: 2023-07-29

## 2023-07-29 RX ADMIN — HALOPERIDOL LACTATE 5 MG: 5 INJECTION, SOLUTION INTRAMUSCULAR at 15:45

## 2023-07-29 RX ADMIN — LORAZEPAM 2 MG: 2 INJECTION INTRAMUSCULAR; INTRAVENOUS at 15:44

## 2023-07-29 RX ADMIN — DIPHENHYDRAMINE HYDROCHLORIDE 25 MG: 50 INJECTION, SOLUTION INTRAMUSCULAR; INTRAVENOUS at 15:46

## 2023-07-29 ASSESSMENT — ENCOUNTER SYMPTOMS
COLOR CHANGE: 0
ABDOMINAL PAIN: 0
SORE THROAT: 0
EYE DISCHARGE: 0
SHORTNESS OF BREATH: 0
RHINORRHEA: 0
CONSTIPATION: 0
ABDOMINAL DISTENTION: 0

## 2023-07-29 ASSESSMENT — LIFESTYLE VARIABLES
HOW MANY STANDARD DRINKS CONTAINING ALCOHOL DO YOU HAVE ON A TYPICAL DAY: 1 OR 2
HOW OFTEN DO YOU HAVE A DRINK CONTAINING ALCOHOL: MONTHLY OR LESS

## 2023-07-29 ASSESSMENT — PAIN - FUNCTIONAL ASSESSMENT: PAIN_FUNCTIONAL_ASSESSMENT: NONE - DENIES PAIN
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
## 2023-07-29 NOTE — ED NOTES
Patient lying in bed. Patient no longer yelling and states [employer]. \" Patient redirectable at this time and is able to follow simple commands. Placed into 2 point opposing limbs at this time. Patient denies any requests at this time.       Trinity Phillips RN  07/29/23 2026

## 2023-07-29 NOTE — ED NOTES
Attempted to give potassium and pt held in her mouth and then spit out the meds in her water.       [employer], RN  07/29/23 4505

## 2023-07-29 NOTE — ED NOTES
Patient calm and resting quietly in bed. Left index finger redressed and restraints removed. No injury noted and no complaints voiced at this time.      Safia Charles RN  07/29/23 6668
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
## 2023-07-29 NOTE — ED NOTES
Zone 1 ED provider updated on patient spitting out medication. No new orders.      Kelly Carrasquillo RN  07/29/23 5343

## 2023-07-29 NOTE — ED NOTES
Patient resting in bed at this time. Restraints removed at this time and patient updated on criteria to remain out of restraints. Nods her head in understanding.       Kelly Carrasquillo RN  07/29/23 6901

## 2023-07-29 NOTE — ED NOTES
Chart to zone 1 and ED provider notified. Call placed to lab to notify of new orders. Patient in room and observed climbing under the foot of the bed. Patient removed the foot brake and threw across the room. Approached by staff and states \"I'm ok now, my Dad is from [place] and I think I am too. \" Patient bizarre     Mario Guerrero, WARREN  07/29/23 4940
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
## 2023-07-30 LAB
HAV IGM SER IA-ACNC: NONREACTIVE
HEPATITIS B CORE IGM ANTIBODY: NONREACTIVE
HEPATITIS B SURF AG,XHBAGS: NONREACTIVE
HEPATITIS C ANTIBODY: NONREACTIVE
HIV AG/AB: NONREACTIVE

## 2023-07-30 NOTE — ED NOTES
Patient transported to [facility] via [facility]. Patient continues to be drowsy.       Casi Nash RN  07/29/23 8097

## 2023-07-30 NOTE — ED NOTES
Patient is sleeping soundly in bed. No distress noted.      Talat Lepe, [address]  07/29/23 9996

## 2023-08-28 ENCOUNTER — HOSPITAL ENCOUNTER (INPATIENT)
Age: 28
LOS: 3 days | Discharge: HOME | DRG: 753 | End: 2023-08-31
Attending: PSYCHIATRY & NEUROLOGY | Admitting: PSYCHIATRY & NEUROLOGY
Payer: MEDICAID

## 2023-08-28 PROBLEM — F23 ACUTE PSYCHOSIS (HCC): Status: ACTIVE | Noted: 2023-08-28

## 2023-08-28 PROCEDURE — 2040000000 HC PSYCH ICU R&B

## 2023-08-28 RX ORDER — POLYETHYLENE GLYCOL 3350 17 G/17G
17 POWDER, FOR SOLUTION ORAL DAILY PRN
Status: DISCONTINUED | OUTPATIENT
Start: 2023-08-28 | End: 2023-08-31 | Stop reason: HOSPADM

## 2023-08-28 RX ORDER — MAGNESIUM HYDROXIDE/ALUMINUM HYDROXICE/SIMETHICONE 120; 1200; 1200 MG/30ML; MG/30ML; MG/30ML
30 SUSPENSION ORAL EVERY 6 HOURS PRN
Status: DISCONTINUED | OUTPATIENT
Start: 2023-08-28 | End: 2023-08-31 | Stop reason: HOSPADM

## 2023-08-28 RX ORDER — TRAZODONE HYDROCHLORIDE 50 MG/1
50 TABLET ORAL NIGHTLY PRN
Status: DISCONTINUED | OUTPATIENT
Start: 2023-08-28 | End: 2023-08-31 | Stop reason: HOSPADM

## 2023-08-28 RX ORDER — HYDROXYZINE 50 MG/1
50 TABLET, FILM COATED ORAL 3 TIMES DAILY PRN
Status: DISCONTINUED | OUTPATIENT
Start: 2023-08-28 | End: 2023-08-31 | Stop reason: HOSPADM

## 2023-08-28 RX ORDER — ACETAMINOPHEN 325 MG/1
650 TABLET ORAL EVERY 4 HOURS PRN
Status: DISCONTINUED | OUTPATIENT
Start: 2023-08-28 | End: 2023-08-31 | Stop reason: HOSPADM

## 2023-08-28 RX ORDER — IBUPROFEN 400 MG/1
400 TABLET ORAL EVERY 6 HOURS PRN
Status: DISCONTINUED | OUTPATIENT
Start: 2023-08-28 | End: 2023-08-31 | Stop reason: HOSPADM

## 2023-08-28 ASSESSMENT — PATIENT HEALTH QUESTIONNAIRE - PHQ9
SUM OF ALL RESPONSES TO PHQ QUESTIONS 1-9: 0
SUM OF ALL RESPONSES TO PHQ9 QUESTIONS 1 & 2: 0
SUM OF ALL RESPONSES TO PHQ QUESTIONS 1-9: 0
SUM OF ALL RESPONSES TO PHQ QUESTIONS 1-9: 0
2. FEELING DOWN, DEPRESSED OR HOPELESS: 0
1. LITTLE INTEREST OR PLEASURE IN DOING THINGS: 0
SUM OF ALL RESPONSES TO PHQ QUESTIONS 1-9: 0

## 2023-08-28 ASSESSMENT — LIFESTYLE VARIABLES
HOW OFTEN DO YOU HAVE A DRINK CONTAINING ALCOHOL: MONTHLY OR LESS
HOW MANY STANDARD DRINKS CONTAINING ALCOHOL DO YOU HAVE ON A TYPICAL DAY: 1 OR 2

## 2023-08-28 ASSESSMENT — SLEEP AND FATIGUE QUESTIONNAIRES
DO YOU HAVE DIFFICULTY SLEEPING: NO
DO YOU USE A SLEEP AID: NO
SLEEP PATTERN: NORMAL
AVERAGE NUMBER OF SLEEP HOURS: 7

## 2023-08-29 PROBLEM — F31.2 SEVERE MANIC BIPOLAR 1 DISORDER WITH PSYCHOTIC BEHAVIOR (HCC): Status: ACTIVE | Noted: 2023-08-29

## 2023-08-29 PROCEDURE — 6370000000 HC RX 637 (ALT 250 FOR IP): Performed by: PSYCHIATRY & NEUROLOGY

## 2023-08-29 PROCEDURE — 2040000000 HC PSYCH ICU R&B

## 2023-08-29 PROCEDURE — 99223 1ST HOSP IP/OBS HIGH 75: CPT | Performed by: PSYCHIATRY & NEUROLOGY

## 2023-08-29 PROCEDURE — 99222 1ST HOSP IP/OBS MODERATE 55: CPT | Performed by: INTERNAL MEDICINE

## 2023-08-29 PROCEDURE — 6370000000 HC RX 637 (ALT 250 FOR IP): Performed by: NURSE PRACTITIONER

## 2023-08-29 RX ORDER — ARIPIPRAZOLE 20 MG/1
20 TABLET ORAL DAILY
Status: DISCONTINUED | OUTPATIENT
Start: 2023-08-29 | End: 2023-08-30

## 2023-08-29 RX ADMIN — IBUPROFEN 400 MG: 400 TABLET, FILM COATED ORAL at 13:51

## 2023-08-29 RX ADMIN — ARIPIPRAZOLE 20 MG: 20 TABLET ORAL at 15:26

## 2023-08-29 ASSESSMENT — PAIN SCALES - GENERAL: PAINLEVEL_OUTOF10: 6

## 2023-08-29 ASSESSMENT — PAIN DESCRIPTION - LOCATION: LOCATION: BACK

## 2023-08-29 ASSESSMENT — PAIN - FUNCTIONAL ASSESSMENT: PAIN_FUNCTIONAL_ASSESSMENT: ACTIVITIES ARE NOT PREVENTED

## 2023-08-29 ASSESSMENT — PAIN DESCRIPTION - DESCRIPTORS: DESCRIPTORS: ACHING

## 2023-08-29 ASSESSMENT — PAIN DESCRIPTION - ORIENTATION: ORIENTATION: LOWER

## 2023-08-29 NOTE — GROUP NOTE
Group Therapy Note    Date: 8/29/2023    Group Start Time: 1100  Group End Time: 9571  Group Topic: Music Therapy    [facility]STER    Ricky Castellano        Group Therapy Note    Attendees: 3/7   Patient's Goal:  Patients worked together to create/organize a playlist of music going from [employer], to gradually becoming more energetic music. At end of group patients discussed environment Goals to improve energy/motivation; Increase sense of community; Increase self-expression; Normalization of the environment; Demonstrate positive use of time    Notes:  Patient attended and participated in group, contributing to group playlist, singing along to music, and engaging in conversations with peers. Patient appropriately engaged in conversation about everyday's activities and factors that effect mood. Briefly left group in middle for meeting with  and returned following    Status After Intervention:  Improved    Participation Level:  Active Listener and Interactive    Participation Quality: Appropriate, Attentive, and Intrusive      Speech:  normal      Thought Process/Content: Logical  Linear      Affective Functioning: Exaggerated      Mood: elevated      Level of consciousness:  Alert and Attentive      Response to Learning: Able to verbalize current knowledge/experience, Able to change behavior, and Progressing to goal      Endings: None Reported    Modes of Intervention: Support, Socialization, Exploration, Activity, Media, and Reality-testing      Discipline Responsible: Psychoeducational Specialist      Signature:  Ricky Castellano

## 2023-08-29 NOTE — PLAN OF CARE
Problem: Risk for Elopement  Goal: Patient will not exit the unit/facility without proper excort  Outcome: Progressing  Note: Pt is free from exit-seeking behaviors. Staff will continue to monitor pt and offer redirection and support as needed. Problem: Psychosis  Goal: Will report no hallucinations or delusions  Description: INTERVENTIONS:  1. Administer medication as  ordered  2. Assist with reality testing to support increasing orientation  3. Assess if patient's hallucinations or delusions are encouraging self harm or harm to others and intervene as appropriate  Outcome: Progressing  Note: Pt denies hallucinations; no responding or self-talk noted. Pt has some delusions of paranoia; remains calm/cooperative with staff. Staff will continue to monitor pt and offer support as needed. Problem: Behavior  Goal: Pt/Family maintain appropriate behavior and adhere to behavioral management agreement, if implemented  Description: INTERVENTIONS:  1. Assess patient/family's coping skills and  non-compliant behavior (including use of illegal substances)  2. Notify security of behavior or suspected illegal substances which indicate the need for search of the family and/or belongings  3. Encourage verbalization of thoughts and concerns in a socially appropriate manner  4. Utilize positive, consistent limit setting strategies supporting safety of patient, staff and others  5. Encourage participation in the decision making process about the behavioral management agreement  6. If a visitor's behavior poses a threat to safety call refer to organization policy. 7. Initiate consult with , Psychosocial CNS, Spiritual Care as appropriate  Outcome: Progressing  Note: Pt maintains behavioral control. Staff will continue to monitor pt and offer support as needed.

## 2023-08-29 NOTE — H&P
Department of Psychiatry  Attending Physician Psychiatric Assessment     Reason for Admission to Psychiatric Unit:  Acute disordered/bizarre behavior or psychomotor agitation or retardation;interferes with ADLs so that patient cannot function at a less intensive care level of care during evaluation and treatment     A mental disorder causing major disability in social, interpersonal, occupational, and/or educational functioning that is leading to dangerous or life-threatening functioning, and that can only be addressed in an acute inpatient setting     Concerns about patient's safety in the community    CHIEF COMPLAINT: Acute psychosis    History obtained from: Patient, electronic medical record          HISTORY OF PRESENT ILLNESS:    Lenard Lindquist is a 25 y.o. female who has a past medical history of mental illness. Patient presented to the ED via police after she was arrested at [facility] for trying to take a stranger's child away from them. She presented with disordered thought process and flight of ideas. Placed on took her to the ED. Patient was combative and aggressive in the ED requiring emergency medications and restraints. Patient has a history of bipolar disorder. She was previously admitted to [address] in 2020. Patient was seen for initial evaluation today. She was pleasant on approach and agreeable to conversation in unit day area. She was offered privacy in her room but she declined. Patient was cooperative and speech was mostly linear and coherent, but patient's thought process is frequently illogical.  She attempted to explain her reasoning for trying to take the child at [facility] and shared that she had read about a woman who had been missing for 28 years and then she thought that this young child was somehow related to the missing woman. She is not yet able to recognize this as an illogical thought process.  Per documentation, patient has a previous incident of attempting to lure a child

## 2023-08-29 NOTE — GROUP NOTE
Group Therapy Note    Date: 8/29/2023    Group Start Time: 1430  Group End Time: 9096  Group Topic: Recreational    [facility]    Jenny Post        Group Therapy Note    Attendees: 2/6     Patient's Goal:  Patients engaged in games and casual conversations. Goals to increase sense of community; Increase socialization; Increase rapport with staff; Demonstrate positive use of time    Notes:  Patient attended and participated in group having positive interactions with peers and staff throughout. Pleasant and appropriately engaged in conversations and games. Status After Intervention:  Improved    Participation Level:  Active Listener and Interactive    Participation Quality: Appropriate, Attentive, and Sharing      Speech:  normal/loud      Thought Process/Content: Logical  Linear      Affective Functioning: Congruent      Mood: euthymic      Level of consciousness:  Alert and Attentive      Response to Learning: Able to verbalize current knowledge/experience and Progressing to goal      Endings: None Reported    Modes of Intervention: Socialization and Activity      Discipline Responsible: Psychoeducational Specialist      Signature:  Jenny Garcia

## 2023-08-29 NOTE — PLAN OF CARE
[facility]  Initial Interdisciplinary Treatment Plan NO      Original treatment plan Date & Time: 8.29.23 0900    Admission Type:  Admission Type:  Involuntary    Reason for admission:   Reason for Admission: PT off medication unknown time pt attempted to steal a child that she thought was endanger at a [employer], pt aggressive in ED required restraints    Estimated Length of Stay:  5-7days  Estimated Discharge Date: to be determined by physician    PATIENT STRENGTHS:  Patient Strengths:   Patient Strengths and Limitations:Limitations: Difficulty problem solving/relies on others to help solve problems, Tendency to isolate self, Difficult relationships / poor social skills, Inappropriate/potentially harmful leisure interests, Unrealistic self-view  Addictive Behavior: Addictive Behavior  In the Past 3 Months, Have You Felt or Has Someone Told You That You Have a Problem With  : None  Medical Problems:  Past Medical History:   Diagnosis Date    Psychiatric problem      Status EXAM:Mental Status and Behavioral Exam  Normal: No  Level of Assistance: Independent/Self  Facial Expression: Avoids Gaze  Affect: Blunt  Level of Consciousness: Lethargic  Frequency of Checks: 4 times per hour, close  Mood:Normal: No  Mood: Anxious, Suspicious  Motor Activity:Normal: No  Motor Activity: Decreased  Eye Contact: Poor  Observed Behavior: Preoccupied, Withdrawn  Sexual Misconduct History: Past - no  Preception: [place] to person, [place] to time  Attention:Normal: No  Attention: Distractible, Unable to concentrate  Thought Processes: Flight of ideas  Thought Content:Normal: No  Thought Content: Paranoia, Delusions  Depression Symptoms: Increased irritability, Impaired concentration  Anxiety Symptoms: Generalized  Adamaris Symptoms: Flight of ideas, Poor judgment  Hallucinations: None  Delusions: Yes  Delusions: Paranoid, Persecutory  Memory:Normal: No  Memory: Poor recent  Insight and Judgment: No  Insight and Judgment:

## 2023-08-29 NOTE — H&P
[facility] Internal Medicine  Tory Francisco MD; Man Adams MD; Jasmine Bates MD; MD Janet Bassett MD; MD RUTH [facility] Internal Medicine   [address]     HISTORY AND PHYSICAL EXAMINATION            Date:   8/29/2023  Patient name:  Radha Johnson  Date of admission:  8/28/2023 11:26 PM  MRN:   [id number]  Account:  [de-identified]  YOB: 1995  PCP:    No primary care provider on file. Room:   [address] [place] [address]  Code Status:    Full Code    Chief Complaint:     No chief complaint on file. Mental health disorder  Hyponatremia hypokalemia  History Obtained From:     Patient medical record nursing staff    History of Present Illness:     Radha Johnson is a 25 y.o. Non- / non  female who presents with No chief complaint on file. and is admitted to the hospital for the management of Severe manic bipolar 1 disorder with psychotic behavior ([address]). 77-year-old  lady history of mental disorder admitted for same recent labs from July and shows severe hyponatremia potassium 2.9 and sodium 134 hyponatremia patient denies any nausea vomiting diarrhea no cathartic diuretic abuse no paresthesia      Past Medical History:     Past Medical History:   Diagnosis Date    Psychiatric problem         Past Surgical History:     History reviewed. No pertinent surgical history. Medications Prior to Admission:     Prior to Admission medications    Medication Sig Start Date End Date Taking? Authorizing Provider   cariprazine hcl (VRAYLAR) 3 MG CAPS capsule Take 1 capsule by mouth nightly   Yes Historical Provider, MD        Allergies:     Patient has no known allergies. Social History:     Tobacco:    reports that she has quit smoking. Her smoking use included cigarettes. She started smoking about 3 years ago. She has never used smokeless tobacco.  Alcohol:      reports current alcohol use.   Drug Use:

## 2023-08-29 NOTE — BH NOTE
[facility]  Admission Note     Admission Type:   Admission Type:  Involuntary    Reason for admission:  Reason for Admission: PT off medication unknown time pt attempted to steal a child that she thought was endanger at a [employer], pt aggressive in ED required restraints      Addictive Behavior:   Addictive Behavior  In the Past 3 Months, Have You Felt or Has Someone Told You That You Have a Problem With  : None    Medical Problems:   Past Medical History:   Diagnosis Date    Psychiatric problem        Status EXAM:  Mental Status and Behavioral Exam  Normal: No  Level of Assistance: Independent/Self  Facial Expression: Avoids Gaze  Affect: Blunt  Level of Consciousness: Lethargic  Frequency of Checks: 4 times per hour, close  Mood:Normal: No  Mood: Anxious, Suspicious  Motor Activity:Normal: No  Motor Activity: Decreased  Eye Contact: Poor  Observed Behavior: Preoccupied, Withdrawn  Sexual Misconduct History: Past - no  Preception: [place] to person, [place] to time  Attention:Normal: No  Attention: Distractible, Unable to concentrate  Thought Processes: Flight of ideas  Thought Content:Normal: No  Thought Content: Paranoia, Delusions  Depression Symptoms: Increased irritability, Impaired concentration  Anxiety Symptoms: Generalized  Adamaris Symptoms: Flight of ideas, Poor judgment  Hallucinations: None  Delusions: Yes  Delusions: Paranoid, Persecutory  Memory:Normal: No  Memory: Poor recent  Insight and Judgment: No  Insight and Judgment: Poor judgment, Poor insight, Unrealistic    Tobacco Screening:  Practical Counseling, on admission, arianne X, if applicable and completed (first 3 are required if patient doesn't refuse):            ( ) Recognizing danger situations (included triggers and roadblocks)                    ( ) Coping skills (new ways to manage stress,relaxation techniques, changing routine, distraction)                                                           ( ) Basic information about

## 2023-08-30 LAB
ANION GAP SERPL CALCULATED.3IONS-SCNC: 8 MMOL/L (ref 9–17)
BUN SERPL-MCNC: 16 MG/DL (ref 6–20)
CALCIUM SERPL-MCNC: 9.5 MG/DL (ref 8.6–10.4)
CHLORIDE SERPL-SCNC: 102 MMOL/L (ref 98–107)
CHOLEST SERPL-MCNC: 137 MG/DL
CHOLESTEROL/HDL RATIO: 3
CO2 SERPL-SCNC: 25 MMOL/L (ref 20–31)
CREAT SERPL-MCNC: 0.8 MG/DL (ref 0.5–0.9)
EST. AVERAGE GLUCOSE BLD GHB EST-MCNC: 77 MG/DL
GFR SERPL CREATININE-BSD FRML MDRD: >60 ML/MIN/1.73M2
GLUCOSE SERPL-MCNC: 96 MG/DL (ref 70–99)
HBA1C MFR BLD: 4.3 % (ref 4–6)
HDLC SERPL-MCNC: 45 MG/DL
LDLC SERPL CALC-MCNC: 77 MG/DL (ref 0–130)
POTASSIUM SERPL-SCNC: 4.7 MMOL/L (ref 3.7–5.3)
SODIUM SERPL-SCNC: 135 MMOL/L (ref 135–144)
TRIGL SERPL-MCNC: 74 MG/DL

## 2023-08-30 PROCEDURE — 2040000000 HC PSYCH ICU R&B

## 2023-08-30 PROCEDURE — 36415 COLL VENOUS BLD VENIPUNCTURE: CPT

## 2023-08-30 PROCEDURE — 99232 SBSQ HOSP IP/OBS MODERATE 35: CPT | Performed by: INTERNAL MEDICINE

## 2023-08-30 PROCEDURE — 6370000000 HC RX 637 (ALT 250 FOR IP): Performed by: PSYCHIATRY & NEUROLOGY

## 2023-08-30 PROCEDURE — 83036 HEMOGLOBIN GLYCOSYLATED A1C: CPT

## 2023-08-30 PROCEDURE — 80061 LIPID PANEL: CPT

## 2023-08-30 PROCEDURE — 80048 BASIC METABOLIC PNL TOTAL CA: CPT

## 2023-08-30 PROCEDURE — 99232 SBSQ HOSP IP/OBS MODERATE 35: CPT | Performed by: PSYCHIATRY & NEUROLOGY

## 2023-08-30 RX ADMIN — ARIPIPRAZOLE 20 MG: 20 TABLET ORAL at 08:44

## 2023-08-30 NOTE — GROUP NOTE
Group Therapy Note    Date: 8/30/2023    Group Start Time: 2886  Group End Time: 7480  Group Topic: Recreational    [facility]    William Lewis        Group Therapy Note    Attendees: 1/6       Patient's Goal:  Patient was only participant and had been requested markers to color with throughout. Engaged in art and conversation throughout. Goals to increase rapport with staff; Demonstrate positive use of time; Normalization of the environment    Notes:  Patient attended and participated in 1:1 and was pleasant and engaging throughout. Patient engaged in art and casual conversation throughout. Status After Intervention:  Improved    Participation Level:  Active Listener and Interactive    Participation Quality: Appropriate, Attentive, and Sharing      Speech:  normal      Thought Process/Content: Logical  Linear      Affective Functioning: Congruent      Mood: euthymic      Level of consciousness:  Alert and Attentive      Response to Learning: Able to verbalize current knowledge/experience and Progressing to goal      Endings: None Reported    Modes of Intervention: Socialization, Activity, and Reality-testing      Discipline Responsible: Psychoeducational Specialist      Signature:  William Lewis

## 2023-08-30 NOTE — GROUP NOTE
Group Therapy Note    Date: 8/30/2023    Group Start Time: 1100  Group End Time: 1297  Group Topic: Music Therapy    [facility]    Vipin Orozco        Group Therapy Note    Attendees: 3/6     Patient's Goal:  Patients shared music and analyzed themes in music, then sharing advice based on themes within their musical selections. Goals to engage in peer support; Increase sense of community; Increase self-expression; Demonstrate positive use of time; Increase socialization    Notes:  Patient attended and participated in group having positive interactions with peers and staff throughout. Was pleasant and appropriately engaged throughout. Patient shared advice about surrounding ones-self with positive people. Was active and appropriate in conversations with others as well about music and advice    Status After Intervention:  Improved    Participation Level:  Active Listener and Interactive    Participation Quality: Appropriate, Attentive, Sharing, and Supportive      Speech:  normal      Thought Process/Content: Logical  Linear      Affective Functioning: Congruent      Mood: euthymic      Level of consciousness:  Alert and Attentive      Response to Learning: Able to verbalize current knowledge/experience, Capable of insight, and Progressing to goal      Endings: None Reported    Modes of Intervention: Support, Socialization, Exploration, Activity, Media, and Reality-testing      Discipline Responsible: Psychoeducational Specialist      Signature:  Vipin Orozco

## 2023-08-30 NOTE — PLAN OF CARE
Problem: Psychosis  Goal: Will report no hallucinations or delusions  Description: INTERVENTIONS:  1. Administer medication as  ordered  2. Assist with reality testing to support increasing orientation  3. Assess if patient's hallucinations or delusions are encouraging self harm or harm to others and intervene as appropriate  Note: PT denies any hallucinations or delusions but appears paranoid and preoccupied. PT slow to respond to assessment questions and has inappropriate laughter while answering . PT very restless through out the night getting up every hour to check the time. PT declined any sleep aid. Problem: Behavior  Goal: Pt/Family maintain appropriate behavior and adhere to behavioral management agreement, if implemented  Description: INTERVENTIONS:  1. Assess patient/family's coping skills and  non-compliant behavior (including use of illegal substances)  2. Notify security of behavior or suspected illegal substances which indicate the need for search of the family and/or belongings  3. Encourage verbalization of thoughts and concerns in a socially appropriate manner  4. Utilize positive, consistent limit setting strategies supporting safety of patient, staff and others  5. Encourage participation in the decision making process about the behavioral management agreement  6. If a visitor's behavior poses a threat to safety call refer to organization policy. 7. Initiate consult with , Psychosocial CNS, Spiritual Care as appropriate  Note: PT remains in behavioral control on unit.

## 2023-08-30 NOTE — PLAN OF CARE
Problem: Psychosis  Goal: Will report no hallucinations or delusions  Description: INTERVENTIONS:  1. Administer medication as  ordered  2. Assist with reality testing to support increasing orientation  3. Assess if patient's hallucinations or delusions are encouraging self harm or harm to others and intervene as appropriate  8/30/2023 1020 by Brenda Jewell RN  Outcome: Progressing  Note: Patient continues to express paranoid delusions, denies hallucinations, no responding to internal stimuli noted. Problem: Behavior  Goal: Pt/Family maintain appropriate behavior and adhere to behavioral management agreement, if implemented  Description: INTERVENTIONS:  1. Assess patient/family's coping skills and  non-compliant behavior (including use of illegal substances)  2. Notify security of behavior or suspected illegal substances which indicate the need for search of the family and/or belongings  3. Encourage verbalization of thoughts and concerns in a socially appropriate manner  4. Utilize positive, consistent limit setting strategies supporting safety of patient, staff and others  5. Encourage participation in the decision making process about the behavioral management agreement  6. If a visitor's behavior poses a threat to safety call refer to organization policy. 7. Initiate consult with , Psychosocial CNS, Spiritual Care as appropriate  8/30/2023 1020 by Brenda Jewell RN  Outcome: Progressing  Note: 1:1 with pt x ten minutes. Pt encouraged to attend unit programming and interact with peers and staff. Pt also encouraged to tend to hygiene and ADLs. Pt encouraged to discuss feelings with staff and feedback and reassurance provided.       Patient remains in behavior control,

## 2023-08-30 NOTE — GROUP NOTE
Group Therapy Note    Date: 8/30/2023    Group Start Time: 0900  Group End Time: 0930  Group Topic: Community Meeting     RHETT Palmer RN        Group Therapy Note    Attendees: 3    Patient attended and participated in community meeting group and set a goal for the day. Staff will continue to educate the importance of group participation.      Signature:  Bimal Palmer RN

## 2023-08-31 VITALS
BODY MASS INDEX: 22.5 KG/M2 | OXYGEN SATURATION: 98 % | RESPIRATION RATE: 14 BRPM | HEART RATE: 71 BPM | DIASTOLIC BLOOD PRESSURE: 64 MMHG | SYSTOLIC BLOOD PRESSURE: 99 MMHG | TEMPERATURE: 99 F | HEIGHT: 66 IN | WEIGHT: 140 LBS

## 2023-08-31 PROCEDURE — 99239 HOSP IP/OBS DSCHRG MGMT >30: CPT | Performed by: PSYCHIATRY & NEUROLOGY

## 2023-08-31 PROCEDURE — 6370000000 HC RX 637 (ALT 250 FOR IP): Performed by: PSYCHIATRY & NEUROLOGY

## 2023-08-31 RX ORDER — ARIPIPRAZOLE 10 MG/1
25 TABLET ORAL DAILY
Qty: 75 TABLET | Refills: 0 | Status: SHIPPED | OUTPATIENT
Start: 2023-09-01

## 2023-08-31 RX ADMIN — ARIPIPRAZOLE 25 MG: 20 TABLET ORAL at 08:10

## 2023-08-31 NOTE — GROUP NOTE
Group Therapy Note    Date: 8/31/2023    Group Start Time: 1000  Group End Time: 6722  Group Topic: Psychotherapy    [facility]    LACEY Faith LSW        Group Therapy Note    Attendees: 4/6       Patient's Goal:  Expression of feeling    Notes:      Status After Intervention:  Improved    Participation Level: Interactive    Participation Quality: Sharing      Speech:  normal      Thought Process/Content: Logical      Affective Functioning: Congruent      Mood: euthymic      Level of consciousness:  Alert and Oriented x4      Response to Learning: Able to verbalize current knowledge/experience      Endings: None Reported    Modes of Intervention: Support      Discipline Responsible: /Counselor      Signature:  LACEY Faith LSW

## 2023-08-31 NOTE — GROUP NOTE
Group Therapy Note    Date: 8/31/2023    Group Start Time: 1100  Group End Time: 4445  Group Topic: Cognitive Skills    [facility]    Eliana Cabral        Group Therapy Note    Attendees: 4/6       Patient's Goal:  To improve interpersonal communication. To improve cognitive thinking through decision making, turn taking and concentration/focus    Notes:  Patient arrived to group late; however,actively participated. Patient was pleasant and appropriate. Patient communicated appropriately with peers. Patient was helpful and  encouraging with peers. Status After Intervention:  Improved    Participation Level:  Active Listener and Interactive    Participation Quality: Appropriate, Attentive, and Supportive      Speech:  normal      Thought Process/Content: Logical      Affective Functioning: Congruent      Mood: euthymic      Level of consciousness:  Alert and Attentive      Response to Learning: Able to verbalize current knowledge/experience, Able to verbalize/acknowledge new learning, and Progressing to goal      Endings: None Reported    Modes of Intervention: Socialization, Exploration, Problem-solving, and Reality-testing      Discipline Responsible: Psychoeducational Specialist      Signature:  Ijeoma David

## 2023-08-31 NOTE — DISCHARGE INSTR - DIET

## 2023-08-31 NOTE — BH NOTE
[facility]  Day 3 Interdisciplinary Treatment Plan NOTE    Review Date & Time: 08/31/2023 0900    Admission Type:   Admission Type: Involuntary    Reason for admission:  Reason for Admission: PT off medication unknown time pt attempted to steal a child that she thought was endanger at a [employer], pt aggressive in ED required restraints  Estimated Length of Stay:  5-7 days  Estimated Discharge Date Update:   to be determined by physician    PATIENT STRENGTHS:  Patient Strengths:   Patient Strengths and Limitations:Limitations: Difficulty problem solving/relies on others to help solve problems, Tendency to isolate self, Difficult relationships / poor social skills, Inappropriate/potentially harmful leisure interests, Unrealistic self-view  Addictive Behavior:Addictive Behavior  In the Past 3 Months, Have You Felt or Has Someone Told You That You Have a Problem With  : None  Medical Problems:  Past Medical History:   Diagnosis Date    Psychiatric problem        Risk:  Fall Risk   Sherif Scale Sherif Scale Score: 22  BVC    Change in scores:  No Changes to plan of Care:  No    Status EXAM:   Mental Status and Behavioral Exam  Normal: No  Level of Assistance: Independent/Self  Facial Expression: Worried  Affect: Appropriate  Level of Consciousness: Alert  Frequency of Checks: 4 times per hour, close  Mood:Normal: No  Mood: Anxious, Depressed  Motor Activity:Normal: Yes  Motor Activity: Decreased  Eye Contact: Fair  Observed Behavior: Preoccupied  Sexual Misconduct History: Current - no  Preception: [place] to person, [place] to place, [place] to situation, [place] to time  Attention:Normal: No  Attention: Distractible  Thought Processes: Circumstantial  Thought Content:Normal: No  Thought Content: Preoccupations  Depression Symptoms: Impaired concentration  Anxiety Symptoms: Generalized, Unexplained fears  Adamaris Symptoms: No problems reported or observed.   Hallucinations: None  Delusions: No  Delusions:

## 2023-08-31 NOTE — DISCHARGE SUMMARY
10 MG tablet               Core Measures statement:   Not applicable      TIME SPENT - 35 MINUTES TO COMPLETE THE EVALUATION, DISCHARGE SUMMARY, MEDICATION RECONCILIATION AND FOLLOW UP CARE                                         Farhat Gary is a 25 y.o. female being evaluated Toño Krueger MD on 8/31/2023 at 1:54 PM    An electronic signature was used to authenticate this note. **This report has been created using voice recognition software. It may contain minor errors which are inherent in voice recognition technology. **

## 2023-08-31 NOTE — DISCHARGE INSTRUCTIONS
Information:  Medications:   Medication summary provided   I understand that I should take only the medications on my list.     -why and when I need to take each medicine.     -which side effects to watch for.     -that I should carry my medication information at all times in case of     Emergency situations. I will take all of my medicines to follow up appointments.     -check with my physician or pharmacist before taking any new    Medication, over the counter product or drink alcohol.    -Ask about food, drug or dietary supplement interactions.    -discard old lists and update records with medication providers. Notify Physician:  Notify physician if you notice:   Always call 911 if you feel your life is in danger  In case of an emergency call 911 immediately! If 911 is not available call your local emergency medical system for help    Behavioral Health Follow Up:  Original Referral Source: [facility]   Discharge Diagnosis: Acute psychosis [facility]) [F23]  Recommendations for Level of Care: Follow up with [facility]   Patient status at discharge: Stable, verbalizes readiness for discharge   [facility]   Aftercare plan faxed: Yes   -faxed by: RN   -date: 8/31/23   -time: 1500  Prescriptions: No medications going home with patient. Smoking: Quit Smoking. Call the [employer]'s smoking quitline at 7-243-80P-QUIT  Know the signs of a heart attack   If you have any of the following symptoms call 911 immediately, do not wait more    Than five minutes. 1. Pressure, fullness and/ or squeezing in the center of the chest spreading to    The jaw, neck or shoulder. 2. Chest discomfort with light headedness, fainting, sweating, nausea or    Shortness of breath. 3. Upper abdominal pressure or discomfort. 4. Lower chest pain, back pain, unusual fatigue, shortness of breath, nausea   Or dizziness.      General Information:   Questions regarding your bill:

## 2023-08-31 NOTE — DISCHARGE INSTR - OTHER ORDERS
Make sure to take all medications prescribed by your DR. Do not double up on doses. If you skip or miss a dose make sure to take the next scheduled dose. Make sure to attend all follow up appointments. Make sure to not do any illicit drugs or alcohol.

## 2023-08-31 NOTE — PLAN OF CARE
Problem: Psychosis  Goal: Will report no hallucinations or delusions  Description: INTERVENTIONS:  1. Administer medication as  ordered  2. Assist with reality testing to support increasing orientation  3. Assess if patient's hallucinations or delusions are encouraging self harm or harm to others and intervene as appropriate  8/30/2023 2024 by Kentrell Wilcox RN  Note: Pt denies appears preoccupied and paranoid. PT does not want to discuss events leading to her being in the hospital states it was just a misunderstanding. Problem: Behavior  Goal: Pt/Family maintain appropriate behavior and adhere to behavioral management agreement, if implemented  Description: INTERVENTIONS:  1. Assess patient/family's coping skills and  non-compliant behavior (including use of illegal substances)  2. Notify security of behavior or suspected illegal substances which indicate the need for search of the family and/or belongings  3. Encourage verbalization of thoughts and concerns in a socially appropriate manner  4. Utilize positive, consistent limit setting strategies supporting safety of patient, staff and others  5. Encourage participation in the decision making process about the behavioral management agreement  6. If a visitor's behavior poses a threat to safety call refer to organization policy. 7. Initiate consult with , Psychosocial CNS, Spiritual Care as appropriate  8/30/2023 2024 by Kentrell Wilcox RN  Note: Pt has been cooperative on unit taking scheduled medications. PT showers multiple times a day and paces unit for most of the shift. PT has poor sleep states she might take a sleep aid this evening. PT maintained on 15 min safety checks and rounds at irregular intervals.

## 2023-08-31 NOTE — BH NOTE
Patient is being discharged today. Pharmacy is unable to fill Abilify due to \"too early to fill, can fill 9/9/23. \" Patient has supply at home.

## 2023-08-31 NOTE — GROUP NOTE
Group Therapy Note    Date: 8/31/2023    Group Start Time: 0900  Group End Time: 0915  Group Topic: Group Documentation    [facility]    Vannie Blizzard, LPN    Community Meeting Group Note        Date: August 31, 2023 Start Time: 9am  End Time:  5169      Number of Participants in Group & Unit Census:  0/6    Topic: Morning Goals Group    Goal of Group:Daily goals-Short term goals      Comments:     Patient did not participate in [employer] group, despite staff encouragement and explanation of benefits. Patient remain seclusive to self. Q15 minute safety checks maintained for patient safety and will continue to encourage patient to attend unit programming.

## 2023-08-31 NOTE — BH NOTE
[facility]  Discharge Note    Pt discharged with followings belongings:   Dental Appliances: None  Vision - Corrective Lenses: None  Hearing Aid: None  Jewelry: None  Body Piercings Removed: N/A  Clothing: Footwear, Pants, Shirt, Slippers, Undergarments  Other Valuables: Keys, Purse (check book,  #[id number]/no cash)   Valuables sent home with patient or returned to patient. Patient educated on aftercare instructions: Yes  Information faxed to [address] by RN  at 5:30 PM .Patient verbalize understanding of AVS:  Yes. Status EXAM upon discharge:  Mental Status and Behavioral Exam  Normal: No  Level of Assistance: Independent/Self  Facial Expression: Worried  Affect: Appropriate  Level of Consciousness: Alert  Frequency of Checks: 4 times per hour, close  Mood:Normal: No  Mood: Anxious, Depressed  Motor Activity:Normal: Yes  Motor Activity: Decreased  Eye Contact: Fair  Observed Behavior: Preoccupied  Sexual Misconduct History: Current - no  Preception: Rico to person, Rico to place, Rico to situation, [place] to time  Attention:Normal: No  Attention: Distractible  Thought Processes: Circumstantial  Thought Content:Normal: No  Thought Content: Preoccupations  Depression Symptoms: Impaired concentration  Anxiety Symptoms: Generalized, Unexplained fears  Adamaris Symptoms: No problems reported or observed.   Hallucinations: None  Delusions: No  Delusions: Paranoid  Memory:Normal: Yes  Memory: Poor recent  Insight and Judgment: No  Insight and Judgment: Poor judgment, Poor insight    Tobacco Screening:  Practical Counseling, on admission, arianne X, if applicable and completed (first 3 are required if patient doesn't refuse):            ( ) Recognizing danger situations (included triggers and roadblocks)                    ( ) Coping skills (new ways to manage stress,relaxation techniques, changing routine, distraction)                                                           ( ) Basic

## 2023-08-31 NOTE — PLAN OF CARE
Problem: Psychosis  Goal: Will report no hallucinations or delusions  Description: INTERVENTIONS:  1. Administer medication as  ordered  2. Assist with reality testing to support increasing orientation  3. Assess if patient's hallucinations or delusions are encouraging self harm or harm to others and intervene as appropriate  Outcome: Progressing  Patient is alert, observed in room. Patient is flat on approach. Patient is currently denying thoughts of wanting to harm self or others. Patient reports not having any tactile, gustatory, auditory, olfactory, or visual hallucinations. Patient reports anxiety and depression relating to life stressors, states feeling paranoid at times of \"other people. \" Patient has been visible on unit social with peers attending unit programming. Patient reports adequate sleep and appetite. Patient hygiene is appropriate, took shower today. No further concerns voiced. Will continue to monitor. Problem: Behavior  Goal: Pt/Family maintain appropriate behavior and adhere to behavioral management agreement, if implemented  Description: INTERVENTIONS:  1. Assess patient/family's coping skills and  non-compliant behavior (including use of illegal substances)  2. Notify security of behavior or suspected illegal substances which indicate the need for search of the family and/or belongings  3. Encourage verbalization of thoughts and concerns in a socially appropriate manner  4. Utilize positive, consistent limit setting strategies supporting safety of patient, staff and others  5. Encourage participation in the decision making process about the behavioral management agreement  6. If a visitor's behavior poses a threat to safety call refer to organization policy.   7. Initiate consult with , Psychosocial CNS, Spiritual Care as appropriate  Outcome: Progressing

## 2023-08-31 NOTE — GROUP NOTE
Group Therapy Note    Date: 8/31/2023    Group Start Time: 1330  Group End Time: 9668  Group Topic: Healthy Living/Wellness    [facility]    Lady Goodejulien        Group Therapy Note    Attendees: 3/6       Patient's Goal:  To identify healthy leisure activities. To participate in healthy stretching and movement. To develop healthy habits    Notes:  Patient attended group and actively participated. Patient reported participating in different leisure activities such as skateboarding, snowboarding and hiking. Patient actively participated in the group stretches. Patient reports being interested in continuing different stretches. Patient was engaged and asked questions. Status After Intervention:  Improved    Participation Level:  Active Listener and Interactive    Participation Quality: Appropriate, Attentive, and Sharing      Speech:  normal      Thought Process/Content: Logical      Affective Functioning: Congruent      Mood: euthymic      Level of consciousness:  Alert and Attentive      Response to Learning: Able to verbalize current knowledge/experience, Able to verbalize/acknowledge new learning, Able to retain information, Capable of insight, and Progressing to goal      Endings: None Reported    Modes of Intervention: Socialization, Exploration, Activity, Movement, and Reality-testing      Discipline Responsible: Psychoeducational Specialist      Signature:  Humberto Lugo

## 2023-09-01 NOTE — CARE COORDINATION
Name: Kenyetta Shook    : 1995    Discharge Date: 23    Primary Auth/Cert #: [id number]    Destination: Private residence    Discharge Medications:      Medication List        START taking these medications      ARIPiprazole 10 MG tablet  Commonly known as: ABILIFY  Take 2.5 tablets by mouth daily  Notes to patient: To control mood             STOP taking these medications      lurasidone 60 MG Tabs tablet  Commonly known as: LATUDA     Vraylar 3 MG Caps capsule  Generic drug: cariprazine hcl               Where to Get Your Medications        These medications were sent to [facility] [address] [facility], [address]  [address] [facility], [address] [place], [place] [address]      Phone: [phone]   ARIPiprazole 10 MG tablet     Pharmacy Instructions:    No medications going home with patient, patient has supply at home.             Follow Up Appointment: [address] [place] [address]  Ph:  [phone]  Fax: [phone]  Go on 2023  You have a therapy appointment at 4:00pm    Please discharge patient to  [address] [place] [address]  Go on 2023  If patient does not have a ride, please contact insurance transportation at [address] [facility] [address]  Ph:  [phone]  Fax: [phone]  Go on 2023  You have a hospital follow up appointment at 1:00pm
SOCIAL SERVICE NOTE: SW meets with patient on this date and she requested a letter be sent to [facility] court at [phone] letting the court know that she is hospitalized, because she has a scheduled court hearing. SW faxed information as requested by patient. Sw also provided patient with information on the [employer] [address] and also the [employer] [facility] through the [facility] on this date.
with disordered thought process and flight of ideas. Patient was last admitted to [facility] in February 2020. Patient was brought into the ED  after trying to take a child from [address] who she felt was in danger. She attempted to explain her reasoning for trying to take the child at [facility] and shared that she had read about a woman who had been missing for 28 years and then she thought that this young child was somehow related to the missing woman. She is not yet able to recognize this as an illogical thought process. Per documentation, patient has a previous incident of attempting to lure a child away from a parent. PT off medications for unknown time. PT aggressive in ED fighting with police and staff pt received emergency medications as was in restraints due to behaviors. Patient feels that this manic episode began approximately 1 month ago following a break-up with a boyfriend of 3 years which prompted her to stop medication and to leave her job. She then states she is on medical leave since July 27. Thought process is disorganized. Patient then shares she was attempting to  her child during a visit and the  attempted to stop her, which resulted in an altercation and the police were called. She reports she was not taking any psychiatric medication at that time. She was arrested and spent a couple of nights in [facility]. She was then admitted to a psychiatric hospital in [place] and started on Abilify. She did not continue taking this medication following discharge in mid August.  Her psychiatric provider at [facility] in [place], [place] got her back on Vraylar and the dose was recently increased to 3 mg daily. Patient is currently presenting with several symptoms of genesis and is reporting no sleep for approximately the last 8 days. Urine drug screen dated 8/28/2023 was negative. Blood alcohol level was negative. Patient denies a history of illicit drug or regular alcohol use.   She
